# Patient Record
Sex: FEMALE | Race: OTHER | Employment: UNEMPLOYED | URBAN - METROPOLITAN AREA
[De-identification: names, ages, dates, MRNs, and addresses within clinical notes are randomized per-mention and may not be internally consistent; named-entity substitution may affect disease eponyms.]

---
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
## 2020-02-06 ENCOUNTER — HOSPITAL ENCOUNTER (EMERGENCY)
Age: 20
Discharge: HOME | End: 2020-02-06
Attending: STUDENT IN AN ORGANIZED HEALTH CARE EDUCATION/TRAINING PROGRAM | Admitting: STUDENT IN AN ORGANIZED HEALTH CARE EDUCATION/TRAINING PROGRAM

## 2020-02-06 VITALS
RESPIRATION RATE: 16 BRPM | BODY MASS INDEX: 19.63 KG/M2 | SYSTOLIC BLOOD PRESSURE: 114 MMHG | HEART RATE: 72 BPM | DIASTOLIC BLOOD PRESSURE: 58 MMHG | OXYGEN SATURATION: 100 % | WEIGHT: 104 LBS | HEIGHT: 61 IN | TEMPERATURE: 98.6 F

## 2020-02-06 DIAGNOSIS — N64.4 BREAST PAIN: Primary | ICD-10-CM

## 2020-02-06 PROCEDURE — 99281 EMR DPT VST MAYX REQ PHY/QHP: CPT

## 2020-02-06 RX ORDER — IBUPROFEN 600 MG/1
600 TABLET ORAL
Qty: 20 TAB | Refills: 0 | OUTPATIENT
Start: 2020-02-06 | End: 2020-07-13

## 2020-02-07 NOTE — ED TRIAGE NOTES
Pt ambulatory to triage, c/o L breast pain with \"little ball\" starting 3 days ago. Describes pain as burning and radiating to whole breast, Denies taking pain medications PTA, denies breast feeding.

## 2020-02-07 NOTE — ED PROVIDER NOTES
23 y.o. female with past medical history significant for hepatitis B and asthma who presents from private vehicle with chief complaint of breast pain. Pt c/o left breast pain and states she noticed a \"little ball\" to the left breast 3 days ago. Pt describes the breast pain as burning. Pt is currently on her menstrual cycle. Pt denies taking any medications for pain relief. Pt denies breast redness/ drainage, breast feeding, or recent breast surgery. There are no other acute medical concerns at this time. PCP: MORTEZA Avitia     Past Medical History:  No date: Asthma  No date: Hepatitis B  No date: History of rashes as a child  No past surgical history on file. Note written by Efrain Estrada, as dictated by Deidra Washington DNP 7:31 PM            The history is provided by the patient. No  was used. Past Medical History:   Diagnosis Date    Asthma     Hepatitis B     History of rashes as a child        No past surgical history on file. No family history on file.     Social History     Socioeconomic History    Marital status: SINGLE     Spouse name: Not on file    Number of children: Not on file    Years of education: Not on file    Highest education level: Not on file   Occupational History    Not on file   Social Needs    Financial resource strain: Not on file    Food insecurity:     Worry: Not on file     Inability: Not on file    Transportation needs:     Medical: Not on file     Non-medical: Not on file   Tobacco Use    Smoking status: Never Smoker    Smokeless tobacco: Never Used   Substance and Sexual Activity    Alcohol use: No    Drug use: No    Sexual activity: Never     Birth control/protection: None   Lifestyle    Physical activity:     Days per week: Not on file     Minutes per session: Not on file    Stress: Not on file   Relationships    Social connections:     Talks on phone: Not on file     Gets together: Not on file Attends Sikh service: Not on file     Active member of club or organization: Not on file     Attends meetings of clubs or organizations: Not on file     Relationship status: Not on file    Intimate partner violence:     Fear of current or ex partner: Not on file     Emotionally abused: Not on file     Physically abused: Not on file     Forced sexual activity: Not on file   Other Topics Concern    Not on file   Social History Narrative    Not on file         ALLERGIES: Patient has no known allergies. Review of Systems   Constitutional: Negative for appetite change, chills, diaphoresis, fatigue and fever. HENT: Negative for congestion, ear discharge, ear pain, sinus pressure, sinus pain, sore throat and trouble swallowing. Eyes: Negative for photophobia, pain, redness and visual disturbance. Respiratory: Negative for cough, chest tightness, shortness of breath and wheezing. Cardiovascular: Negative for chest pain and palpitations. Gastrointestinal: Negative for abdominal distention, abdominal pain, nausea and vomiting. Endocrine: Negative. Genitourinary: Negative for difficulty urinating, dysuria, flank pain, frequency and urgency. Musculoskeletal: Negative for back pain, neck pain and neck stiffness. + Left breast pain. Skin: Negative for color change, pallor, rash and wound. Allergic/Immunologic: Negative. Neurological: Negative for dizziness, syncope, speech difficulty, weakness and headaches. Hematological: Does not bruise/bleed easily. Psychiatric/Behavioral: Negative for behavioral problems and confusion. The patient is not nervous/anxious. All other systems reviewed and are negative. There were no vitals filed for this visit. Physical Exam  Vitals signs and nursing note reviewed. Constitutional:       General: She is not in acute distress. Appearance: She is well-developed. HENT:      Head: Normocephalic and atraumatic.       Right Ear: External ear normal.      Left Ear: External ear normal.      Nose: Nose normal.      Mouth/Throat:      Mouth: Mucous membranes are moist.   Eyes:      General:         Right eye: No discharge. Left eye: No discharge. Conjunctiva/sclera: Conjunctivae normal.      Pupils: Pupils are equal, round, and reactive to light. Neck:      Musculoskeletal: Normal range of motion and neck supple. Vascular: No JVD. Trachea: No tracheal deviation. Cardiovascular:      Rate and Rhythm: Normal rate and regular rhythm. Pulses: Normal pulses. Heart sounds: Normal heart sounds. No murmur. No gallop. Pulmonary:      Effort: Pulmonary effort is normal. No respiratory distress. Breath sounds: Normal breath sounds. No wheezing or rales. Chest:      Chest wall: No tenderness. Comments: Painful dime size lump that is at 3 o'clock position of the left breast. No cellulits. Abdominal:      General: Bowel sounds are normal. There is no distension. Palpations: Abdomen is soft. Tenderness: There is no abdominal tenderness. There is no guarding or rebound. Genitourinary:     Comments: C/o breast pain and lump left side    Musculoskeletal: Normal range of motion. General: No tenderness. Skin:     General: Skin is warm and dry. Coloration: Skin is not pale. Findings: No erythema or rash. Neurological:      Mental Status: She is alert and oriented to person, place, and time. Cranial Nerves: No cranial nerve deficit. Psychiatric:         Behavior: Behavior normal.         Thought Content: Thought content normal.         Judgment: Judgment normal.     Note written by Jcarlos Velez, as dictated by Alexei Martínez DNP 7:31 PM       MDM  Number of Diagnoses or Management Options  Breast pain: new and does not require workup  Diagnosis management comments: Discharged home and follow-up with PCP.   Follow-up with OB/GYN for questionable breast ultrasound. No evidence of cellulitis. No need for p.o. antibiotics. 7:41 PM  Pt has been examined. Left breast with dime sized lump palpable. Patient states painful. Will have patient follow up with OB/GYN. Motrin ordered for pain. No evidence of cellulitis, not breast feeding. Pt agrees to F/U as instructed and agrees to return to ED upon further deterioration. Pt is ready to go home.   Mariah Josue NP      Procedures

## 2020-02-07 NOTE — DISCHARGE INSTRUCTIONS
Patient Education        Dolor de senos: Instrucciones de cuidado - [ Breast Pain: Care Instructions ]  Instrucciones de cuidado    La sensibilidad y el dolor de senos podría aparecer y desaparecer con los periodos menstruales (cíclico) o podría no seguir ningún patrón (no cíclico). El dolor de senos hugo vez es causado por un problema de darell grave. Podría ser necesario hacer pruebas para averiguar la causa. La atención de seguimiento es chemo parte clave de noguera tratamiento y seguridad. Asegúrese de hacer y acudir a todas las citas, y llame a noguera médico si está teniendo problemas. También es chemo buena idea saber los resultados de caridad exámenes y mantener chemo lista de los medicamentos que angelo. ¿Cómo puede cuidarse en el hogar? · Si noguera médico le recetó un medicamento, tómelo exactamente según las indicaciones. Llame a noguera médico si bipin estar teniendo problemas con noguera medicamento. · Para aliviar el dolor y la hinchazón, tome un analgésico (medicamento para el dolor) de venta scott, galileo acetaminofén (Tylenol), ibuprofeno (Advil, Motrin) o naproxeno (Aleve). Dang y siga todas las instrucciones de la [place]. · No tome dos o más analgésicos al [employer], a menos que el médico se lo haya indicado. Muchos analgésicos contienen acetaminofén, es decir, Tylenol. El exceso de acetaminofén (Tylenol) puede ser dañino. · Use un sostén que le dé buen soporte, galileo los que se usan para hacer deporte o correr. · Reduzca la cantidad de grasa en noguera dieta. Si necesita ayuda para planificar comidas saludables, consulte a un dietista. · Kaela por lo menos 30 minutos de ejercicio la mayoría de los días de la [place]. Caminar es chemo buena opción. Es posible que también quiera hacer otras actividades, galileo correr, nadar, [employer], o jugar al tenis o practicar otros deportes de equipo. · Mantenga un patrón de sueño saludable. Rose Adas a dormir a la misma hora todas las noches y levántese a la misma hora cada día.   ¿Cuándo debe pedir ayuda? Llame a noguera médico ahora mismo o busque atención médica inmediata si:    · Tiene cambios nuevos en un seno, galileo:  ? Un bulto o engrosamiento en el seno o la axila. ? Un cambio en el tamaño o la forma del seno. ? Cambios en la piel, galileo hoyuelos o arrugas. ? Secreción de [employer]. ? Un cambio en el color o la textura de la piel del seno o la ingrid oscura alrededor del pezón (areola). ? Un cambio en la forma del pezón (podría parecer galileo si estuviera hundido en el seno).     · Tiene síntomas de infección en el seno, tales galileo:  ? Aumento del dolor, la hinchazón, el enrojecimiento o la temperatura alrededor del seno. ? Vetas rojizas que se extienden desde el seno. ? Pus que supura de un seno. ? Am[place] especial atención a los cambios en noguera darell y asegúrese de comunicarse con noguera médico si:    · Noguera dolor de los senos no disminuye después de 1 semana.     · Tiene un bulto o engrosamiento en el seno o la axila. ¿Dónde puede encontrar más información en inglés? Tad Given a http://www.gray.com/. Silvia R319 en la búsqueda para aprender más acerca de \"Dolor de senos: Instrucciones de cuidado - [ Breast Pain: Care Instructions ]. \"  Revisado: 26 junio, 2019  Versión del contenido: 12.2  © 6071-3413 Healthwise, Incorporated. Las instrucciones de cuidado fueron adaptadas bajo licencia por [employer] (which disclaims liability or warranty for this information). Si usted tiene [place] afección médica o sobre estas instrucciones, siempre pregunte a noguera profesional de darell. [facility] niega toda garantía o responsabilidad por noguera uso de esta información.

## 2020-07-13 ENCOUNTER — APPOINTMENT (OUTPATIENT)
Dept: GENERAL RADIOLOGY | Age: 20
End: 2020-07-13
Attending: EMERGENCY MEDICINE
Payer: SELF-PAY

## 2020-07-13 ENCOUNTER — APPOINTMENT (OUTPATIENT)
Dept: ULTRASOUND IMAGING | Age: 20
End: 2020-07-13
Attending: EMERGENCY MEDICINE
Payer: SELF-PAY

## 2020-07-13 ENCOUNTER — HOSPITAL ENCOUNTER (EMERGENCY)
Age: 20
Discharge: HOME | End: 2020-07-13
Attending: EMERGENCY MEDICINE | Admitting: EMERGENCY MEDICINE
Payer: SELF-PAY

## 2020-07-13 VITALS
OXYGEN SATURATION: 99 % | RESPIRATION RATE: 16 BRPM | HEIGHT: 61 IN | BODY MASS INDEX: 20.77 KG/M2 | SYSTOLIC BLOOD PRESSURE: 104 MMHG | WEIGHT: 110 LBS | TEMPERATURE: 98.6 F | DIASTOLIC BLOOD PRESSURE: 63 MMHG | HEART RATE: 75 BPM

## 2020-07-13 DIAGNOSIS — R10.32 ABDOMINAL PAIN, LLQ (LEFT LOWER QUADRANT): ICD-10-CM

## 2020-07-13 DIAGNOSIS — N94.6 DYSMENORRHEA: Primary | ICD-10-CM

## 2020-07-13 LAB
APPEARANCE UR: CLEAR
BACTERIA URNS QL MICRO: NEGATIVE /HPF
BILIRUB UR QL: NEGATIVE
COLOR UR: ABNORMAL
EPITH CASTS URNS QL MICRO: ABNORMAL /LPF
GLUCOSE UR STRIP.AUTO-MCNC: NEGATIVE MG/DL
HCG UR QL: NEGATIVE
HGB UR QL STRIP: ABNORMAL
KETONES UR QL STRIP.AUTO: NEGATIVE MG/DL
LEUKOCYTE ESTERASE UR QL STRIP.AUTO: NEGATIVE
NITRITE UR QL STRIP.AUTO: NEGATIVE
PH UR STRIP: 6 [PH] (ref 5–8)
PROT UR STRIP-MCNC: NEGATIVE MG/DL
RBC #/AREA URNS HPF: ABNORMAL /HPF (ref 0–5)
SP GR UR REFRACTOMETRY: 1.01 (ref 1–1.03)
UA: UC IF INDICATED,UAUC: ABNORMAL
UROBILINOGEN UR QL STRIP.AUTO: 1 EU/DL (ref 0.2–1)
WBC URNS QL MICRO: ABNORMAL /HPF (ref 0–4)

## 2020-07-13 PROCEDURE — 81025 URINE PREGNANCY TEST: CPT

## 2020-07-13 PROCEDURE — 99284 EMERGENCY DEPT VISIT MOD MDM: CPT

## 2020-07-13 PROCEDURE — 74019 RADEX ABDOMEN 2 VIEWS: CPT

## 2020-07-13 PROCEDURE — 74011250637 HC RX REV CODE- 250/637: Performed by: EMERGENCY MEDICINE

## 2020-07-13 PROCEDURE — 76830 TRANSVAGINAL US NON-OB: CPT

## 2020-07-13 PROCEDURE — 81001 URINALYSIS AUTO W/SCOPE: CPT

## 2020-07-13 RX ORDER — IBUPROFEN 600 MG/1
600 TABLET ORAL
Qty: 20 TAB | Refills: 0 | Status: SHIPPED | OUTPATIENT
Start: 2020-07-13 | End: 2020-11-19 | Stop reason: SDUPTHER

## 2020-07-13 RX ORDER — DICYCLOMINE HYDROCHLORIDE 10 MG/1
10 CAPSULE ORAL 4 TIMES DAILY
Qty: 20 CAP | Refills: 0 | Status: SHIPPED | OUTPATIENT
Start: 2020-07-13 | End: 2020-07-18

## 2020-07-13 RX ORDER — IBUPROFEN 600 MG/1
600 TABLET ORAL
Status: COMPLETED | OUTPATIENT
Start: 2020-07-13 | End: 2020-07-13

## 2020-07-13 RX ADMIN — IBUPROFEN 600 MG: 600 TABLET, FILM COATED ORAL at 12:00

## 2020-07-13 NOTE — DISCHARGE INSTRUCTIONS
Patient Education   Patient Education        Estreñimiento: Instrucciones de cuidado  Constipation: Care Instructions  Instrucciones de cuidado    Tener estreñimiento significa que usted tiene dificultades para eliminar las heces (evacuaciones del intestino). Las personas eliminan heces entre 3 veces al día y Membreno Blanks vez cada 3 días. Lo que es normal para usted puede ser [employer]. El estreñimiento puede ocurrir con dolor en el recto y cólicos. El dolor podría empeorar cuando trata de eliminar las heces. A veces hay pequeñas cantidades de darryl julio viva en el papel higiénico o en la superficie de las heces. [place] se debe a las venas dilatadas cerca del recto (hemorroides). Algunos cambios en noguera [employer] y estilo de armen podrían ayudarle a evitar el estreñimiento continuo. Es posible que el médico además le recete medicamentos para ayudar a aflojar las heces. Algunos medicamentos pueden causar estreñimiento. [place] incluye los analgésicos (medicamentos para el dolor) y los antidepresivos. Infórmele a noguera médico sobre Griffin Renato que usted angelo. Es posible que noguera médico quiera cambiar un medicamento para aliviar caridad síntomas. La atención de seguimiento es chemo parte clave de noguera tratamiento y seguridad. Asegúrese de hacer y acudir a todas las citas, y llame a noguera médico si está teniendo problemas. También es chemo buena idea saber los resultados de caridad exámenes y mantener chemo lista de los medicamentos que angelo. ¿Cómo puede cuidarse en el hogar? · Enriqueta abundantes líquidos, los suficientes galileo para que noguera orina sea de color amarillo destinee o transparente galileo el agua. Si tiene [employer], del corazón o del hígado y tiene que Turner's líquidos, hable con noguera médico antes de aumentar noguera consumo. · Incluya en noguera dieta diaria alimentos ricos en fibra. Estos incluyen frutas, verduras, frijoles (habichuelas) y granos integrales.   · Kaela por lo menos 30 minutos de ejercicio la mayoría de los días de la semana. Caminar es chemo buena opción. Es posible que también quiera hacer otras actividades, galileo correr, nadar, [employer], o jugar al tenis u otros deportes de equipo. · [place] un suplemento de [place], galileo [employer] o Metamucil, todos los GRASSE. Dang y siga todas las indicaciones de la [place]. · Programe tiempo todos los días para evacuar el intestino. Екатерина Helen rutina diaria podría ayudar. Tómese noguera tiempo para evacuar el intestino. · Apoye los pies sobre un banco o taburete pequeño cuando se siente en el inodoro. [place] ayuda a flexionar las caderas y coloca la pelvis en posición de cuclillas. · Noguera médico podría recomendarle un laxante de venta scott para aliviar el estreñimiento. Lourena Danker son Birdie Bridegroom de [place]es[place] (Milk of Magnesia) y [place]. Dang y siga todas las instrucciones de la [place]. No use laxantes de [employer]. ¿Cuándo debe pedir ayuda? Llame a noguera médico ahora mismo o busque atención médica inmediata si:  · Tiene dolor abdominal nuevo o peor. · Tiene náuseas o vómito nuevos o peores. · Tiene darryl en las heces. [place] especial atención a los cambios en noguera darell y asegúrese de comunicarse con noguera médico si:  · Noguera estreñimiento empeora. · No mejora galileo se esperaba. ¿Dónde puede encontrar más información en inglés? Vaya a http://ann-edwardo.info/  Silvia P343 en la búsqueda para aprender Ramila Wilson de \"Estreñimiento: Instrucciones de cuidado. \"  Revisado: 26 junio, 2266               KAR del contenido: 12.5  © 3189-4643 Healthwise, [employer]. Las instrucciones de cuidado fueron adaptadas bajo licencia por [employer] Help Connections (which disclaims liability or warranty for this information). Si usted tiene [place] afección médica o sobre estas instrucciones, siempre pregunte a noguera profesional de darell. [email or web address], [email or web address] niega toda garantía o responsabilidad por noguera uso de esta información.             Cólicos menstruales dolorosos: Instrucciones de cuidado  Painful Menstrual Cramps: Care Instructions  Instrucciones de cuidado    Los cólicos menstruales dolorosos son muy comunes. Muchas mujeres van al médico por cólicos intensos cuando tienen noguera período. Usted puede tener calambres en la espalda, los muslos y el abdomen. También podría tener diarrea, estreñimiento o náuseas. Algunas mujeres también se marean. Los analgésicos (medicamentos para el dolor) y el tratamiento en el hogar pueden ayudar a que se sienta mejor. La atención de seguimiento es chemo parte clave de noguera tratamiento y seguridad. Asegúrese de hacer y acudir a todas las citas, y llame a noguera médico si está teniendo problemas. También es chemo buena idea saber los resultados de caridad exámenes y mantener chemo lista de los medicamentos que angelo. ¿Cómo puede cuidarse en el hogar? · [place] antiinflamatorios para el dolor. El ibuprofeno (Advil, Motrin) y el naproxeno (Aleve) suelen funcionar mejor que la aspirina. ? Sea jenelle con los medicamentos. Hable con noguera médico o noguera farmacéutico antes de jay cualquiera de estos medicamentos. Podrían no ser seguros si angelo otros medicamentos o si tiene otros problemas de [place]. ? Comience a jay la dosis recomendada de analgésico tan pronto galileo sienta dolor. O puede comenzar el día anterior al inicio de noguera período menstrual. Siga tomando el medicamento por todo el tiempo que tenga cólicos. ? Si los medicamentos antiinflamatorios no ayudan, intente con acetaminofén (Tylenol). ? No tome dos o más analgésicos al [employer], a menos que el médico se lo haya indicado. Muchos analgésicos contienen acetaminofén, es decir, Tylenol. El exceso de acetaminofén (Tylenol) puede ser dañino. ? Dang y [address]. · Póngase chemo almohadilla térmica ajustada a baja temperatura o chemo bolsa de [place] sobre el abdomen. O dese un baño de agua tibia. El calor mejora el flujo de darryl y podría aliviar el dolor.   · Acuéstese y ponga chemo almohada debajo de caridad rodillas. O acuéstese de lado con las rodillas dobladas hacia el pecho. [place] ayudará con cualquier tensión en la espalda. · Kaela al menos 30 minutos de ejercicio la mayoría de los días de la [place]. [place] mejora el flujo de darryl y podría reducir el dolor. Caminar es chemo buena opción. Es posible que también quiera hacer otras actividades, galileo correr, nadar, [employer], o jugar al tenis u otros deportes de equipo. ¿Cuándo debe pedir ayuda? Llame [id number] en cualquier momento que considere que necesita atención de [place]. Por ejemplo, llame si:  · Se desmayó (perdió el conocimiento). Llame a noguera médico ahora mismo o busque atención médica inmediata si:  · Tiene dolor repentino e intenso en el abdomen o la pelvis. · Tiene sangrado vaginal intenso. Intenso significa que empapa las toallas sanitarias o los tampones usuales cada hora, abbe 2 o más horas, y elimina coágulos de Jenni. · Siente mareos o aturdimiento, o que está a punto de [place]. Preste especial atención a los cambios en noguera darell y asegúrese de comunicarse con noguera médico si:  · Piensa que pudiera estar Kimberly Pamela. · Tiene un dolor nuevo en el abdomen o la pelvis. · Está tomando un analgésico, bee no la está ayudando. · Se siente débil y cansada. ¿Dónde puede encontrar más información en inglés? Vaya a http://ann-edwardo.info/  Silvia Q153 en la búsqueda para aprender más acerca de \"Cólicos menstruales dolorosos: Instrucciones de cuidado. \"  Revisado: 8 novcash, 4927               ZBRBTAM del contenido: 12.5  © 1627-2204 Healthwise, [employer]. Las instrucciones de cuidado fueron adaptadas bajo licencia por Good [employer] Connections (which disclaims liability or warranty for this information). Si usted tiene [place] afección médica o sobre estas instrucciones, siempre pregunte a noguera profesional de darell.  [place], [employer] niega toda garantía o responsabilidad por noguera uso de esta información.

## 2020-07-13 NOTE — ED TRIAGE NOTES
Patient arrives with c/o L \"ovary pain\" x 2 weeks with light spotting. States her LMP was 6/8/20 and has not had a period this month yet. Pregnancy test taken PTA = negative.

## 2020-07-13 NOTE — ED PROVIDER NOTES
HPI patient is a 80-year-old female who presents the ED reporting 2 weeks of left lower quadrant pain that seems to be worse at night. She started her menstrual cycle 3 days ago but states that it is lighter than usual and has not relieved the pain. She did a home pregnancy test which was negative. A1. Denies fever, cold symptoms, headache, neck pain, visual changes, focal weakness or rash. Denies any difficulty breathing, difficulty swallowing, SOB or chest pain. Denies any nausea, vomiting, urinary symptoms or diarrhea. Pt. Reports that she has not had any food or medications today prior to arrival.        Past Medical History:   Diagnosis Date    Asthma     Hepatitis B     History of rashes as a child        No past surgical history on file. No family history on file.     Social History     Socioeconomic History    Marital status: SINGLE     Spouse name: Not on file    Number of children: Not on file    Years of education: Not on file    Highest education level: Not on file   Occupational History    Not on file   Social Needs    Financial resource strain: Not on file    Food insecurity     Worry: Not on file     Inability: Not on file    Transportation needs     Medical: Not on file     Non-medical: Not on file   Tobacco Use    Smoking status: Never Smoker    Smokeless tobacco: Never Used   Substance and Sexual Activity    Alcohol use: No    Drug use: No    Sexual activity: Never     Birth control/protection: None   Lifestyle    Physical activity     Days per week: Not on file     Minutes per session: Not on file    Stress: Not on file   Relationships    Social connections     Talks on phone: Not on file     Gets together: Not on file     Attends Alevism service: Not on file     Active member of club or organization: Not on file     Attends meetings of clubs or organizations: Not on file     Relationship status: Not on file    Intimate partner violence     Fear of current or ex partner: Not on file     Emotionally abused: Not on file     Physically abused: Not on file     Forced sexual activity: Not on file   Other Topics Concern    Not on file   Social History Narrative    Not on file         ALLERGIES: Patient has no known allergies. Review of Systems   Constitutional: Negative for activity change, appetite change, fever and unexpected weight change. HENT: Negative for rhinorrhea, sore throat and trouble swallowing. Eyes: Negative for visual disturbance. Respiratory: Negative for cough and shortness of breath. Cardiovascular: Negative for chest pain, palpitations and leg swelling. Gastrointestinal: Negative for abdominal pain, diarrhea, nausea and vomiting. Genitourinary: Positive for pelvic pain and vaginal bleeding. Negative for dysuria. Musculoskeletal: Negative for arthralgias, back pain and myalgias. Skin: Negative for rash. Neurological: Negative for dizziness and light-headedness. All other systems reviewed and are negative. Vitals:    07/13/20 1105   BP: 113/73   Pulse: 75   Resp: 16   Temp: 98.6 °F (37 °C)   SpO2: 100%   Weight: 49.9 kg (110 lb)   Height: 5' 1\" (1.549 m)            Physical Exam  Vitals signs and nursing note reviewed. Constitutional:       General: She is not in acute distress. Appearance: She is well-developed. She is not ill-appearing, toxic-appearing or diaphoretic. Comments: Female; non-smoker   HENT:      Head: Normocephalic. Mouth/Throat:      Mouth: Mucous membranes are moist.   Cardiovascular:      Rate and Rhythm: Normal rate and regular rhythm. Pulmonary:      Effort: Pulmonary effort is normal.      Breath sounds: Normal breath sounds. Abdominal:      Palpations: Abdomen is soft. Tenderness: There is abdominal tenderness in the left lower quadrant. There is no guarding or rebound.    Genitourinary:     Comments: Patient refuses pelvic exam after having transvaginal ultrasound; she denies any active vaginal bleeding or discharge. Skin:     General: Skin is warm and dry. Findings: No rash. Neurological:      Mental Status: She is alert and oriented to person, place, and time. Psychiatric:         Mood and Affect: Mood normal.         Behavior: Behavior normal.          MDM       Procedures      Xr Abd Flat/ Erect    Result Date: 7/13/2020  IMPRESSION: Gas pattern is within normal limits. There is slight fecal stasis right and transverse colon. Us Transvaginal    Result Date: 7/13/2020  IMPRESSION: Normal pelvic ultrasound. Labs Reviewed   URINALYSIS W/ REFLEX CULTURE - Abnormal; Notable for the following components:       Result Value    Blood MODERATE (*)     All other components within normal limits   HCG URINE, QL. - POC     Patient has been reexamined and reports good relief from medications given. Discussed the need for increased fiber and fluids in her diet; all recommendations were reviewed. Encourage close follow-up with OB/GYN if symptoms persist.    1:20 PM  Patient's results and plan of care have been reviewed with her. Patient has verbally conveyed her understanding and agreement of her signs, symptoms, diagnosis, treatment and prognosis and additionally agrees to follow up as recommended or return to the Emergency Room should her condition change prior to follow-up. Discharge instructions have also been provided to the patient with some educational information regarding her diagnosis as well a list of reasons why she would want to return to the ER prior to her follow-up appointment should her condition change. Gabino Pablo NP

## 2020-07-13 NOTE — ED NOTES
MD Brian Rubio reviewed discharge instructions with the patient. The patient verbalized understanding. Pt confirmed understanding of need for follow up with primary care provider. Important sections of discharge instructions highlighted for patient. Pt is not in any current distress and shows no evidence of clinical instability. Pt is hemodynamically/respiratorily stable. Paperwork given by provider and reviewed with patient, opportunity for questions/clarification given. Pt was given work note if applicable/requested. Pt given RX for motrin/bentyl. Pt denies any additional complaints. Pt walked out of ED.     Patient Vitals for the past 4 hrs:   Temp Pulse Resp BP SpO2   07/13/20 1159 -- -- -- 104/63 98 %   07/13/20 1105 98.6 °F (37 °C) 75 16 113/73 100 %         Cristina Stephenson RN

## 2020-11-04 ENCOUNTER — VIRTUAL VISIT (OUTPATIENT)
Dept: FAMILY MEDICINE CLINIC | Age: 20
End: 2020-11-04

## 2020-11-04 DIAGNOSIS — J45.909 UNCOMPLICATED ASTHMA, UNSPECIFIED ASTHMA SEVERITY, UNSPECIFIED WHETHER PERSISTENT: ICD-10-CM

## 2020-11-04 DIAGNOSIS — K29.70 GASTRITIS WITHOUT BLEEDING, UNSPECIFIED CHRONICITY, UNSPECIFIED GASTRITIS TYPE: ICD-10-CM

## 2020-11-04 DIAGNOSIS — F41.9 ANXIETY: Primary | ICD-10-CM

## 2020-11-04 DIAGNOSIS — K59.00 CONSTIPATION, UNSPECIFIED CONSTIPATION TYPE: ICD-10-CM

## 2020-11-04 PROCEDURE — 99443 PR PHYS/QHP TELEPHONE EVALUATION 21-30 MIN: CPT | Performed by: NURSE PRACTITIONER

## 2020-11-04 RX ORDER — ALBUTEROL SULFATE 90 UG/1
2 AEROSOL, METERED RESPIRATORY (INHALATION)
Qty: 1 INHALER | Refills: 0 | Status: SHIPPED | OUTPATIENT
Start: 2020-11-04 | End: 2021-02-22 | Stop reason: SDUPTHER

## 2020-11-04 RX ORDER — DOCUSATE SODIUM 100 MG/1
100 CAPSULE, LIQUID FILLED ORAL 2 TIMES DAILY
Qty: 60 CAP | Refills: 0 | Status: SHIPPED | OUTPATIENT
Start: 2020-11-04 | End: 2020-11-19 | Stop reason: SDUPTHER

## 2020-11-04 NOTE — PROGRESS NOTES
11:10 AM  Return for cody soon (anxiety); H pylori and nonfasting urgent labs soon; F2F visit [facility] when avail (tailbone). Discharge nurse encounter completed via telephoned call. Name and  verified. AVS, prescription and pharmacy location reviewed . Patient to follow up with CVAN counselor and lab appt. For non fasting labs and h-pilory kit and instructions - registration aware and will help with scheduling this appointments. This has been fully explained to the patient, who indicates understanding and agrees with plan. No further questions at this time. Diane Rivers RN    At the end of telephone call patient requesting a refill on ventolin Inhaler, please see refill encounter for further details.  Diane Rivers RN

## 2020-11-04 NOTE — PROGRESS NOTES
Coordination of Care  1. Have you been to the ER, urgent care clinic since your last visit? Hospitalized since your last visit? No    2. Have you seen or consulted any other health care providers outside of the [address] [place] [address] since your last visit? Include any pap smears or colon screening. No    Does the patient need refills? YES    Learning Assessment Complete? yes  Depression Screening complete in the past 12 months? yes    Patient is at work at this moment and has no access to vital sign equipment.

## 2020-11-04 NOTE — TELEPHONE ENCOUNTER
Nurse telephoning patient to go over DC instructions. Patient requesting a refill for ventolin HFA. Patient would like for medication to go to [facility] due to better price. [employer] coupon code sent to patient, per patient's request. Instructed patient that request will be send to provider and that she should check with the pharmacy after 4:00pm. This has been fully explained to the patient, who indicates understanding and agrees with plan. No further questions at this time.  Jessi Reddy RN

## 2020-11-04 NOTE — PROGRESS NOTES
: [place] (but patient does speak [place])   Consent: She and/or health care decision maker has provided verbal consent to proceed: Yes   Pursuant to the emergency declaration under the [facility][note 3][facility], [facility] waiver authority and the [employer] Act, this Virtual Telephone Visit was conducted to reduce the patient's risk of exposure to COVID-19. Total Time: minutes: 21-30 minutes  Diagnoses and all orders for this visit:    1. Anxiety  -     REFERRAL TO BEHAVIORAL HEALTH  -     METABOLIC PANEL, COMPREHENSIVE; Future  -     HEMOGLOBIN A1C WITH EAG; Future  -     HCG QL SERUM; Future  -     CBC WITH AUTOMATED DIFF; Future  -     TSH 3RD GENERATION; Future    2. Gastritis without bleeding, unspecified chronicity, unspecified gastritis type  -     H PYLORI AG, STOOL; Future    3. Constipation, unspecified constipation type  -     docusate sodium (COLACE) 100 mg capsule; Take 1 Cap by mouth two (2) times a day. For constipation. 4. Uncomplicated asthma, unspecified asthma severity, unspecified whether persistent    Refill for albuterol sent in per patient request.  Follow-up and Dispositions    · Return for cody soon (anxiety); H pylori and nonfasting urgent labs soon; F2F visit [facility] when avail (tailbone). I communicated with the patient and/or health care decision maker about details of this discussion including any medical advice provided: we discussed and reviewed deep breathing for in the moment anxiety. Agrees to counseling with Cristina Crowe. Desiring pregnancy, no medications at this time. Also pain of her tailbone - to evaluate when she returns F2F following labs and H Pylori testing.    -come in for H Pylori and nonfasting urgent labs  -then follow up F2F with [facility] (tailbone injury, painful defecation)  -Princess Huffman is a 21 y.o. female evaluated via telephone on 11/4/2020.   Patient identification verified with 2 identifiers. Chief Complaint   Patient presents with    Anxiety     x4 weeks    Epigastric Pain     pt c/o pain and nausea, bloated getting worse in the last week. History of Present Illness  When patient made the appointment, the reasons stated were menstrual problems and acid reflux. No LMP recorded. When was first day of LMP? October 16  Could you be pregnant? Desires pregnancy  Specific symptoms include panic when not busy, not feeling rested  Have you taken any medications for this? no  Not improving  Anxiety - overwhelming emotions, can't catch breath. As a child lots of trauma and memories come back. Her fiance is an officer and she worries about him. History of cutting - but she doesn't do that anymore. She works in construction. Pressure of household chores. She is restless. She drinks z-quil every night. She feels unrested in the morning. She is trying to be pregnant. Deep breathing. Randomly cry. Not suicidal.  Would like to go away like 2 hours to her 's parent's house but has too much anxiety to ride in the car. When she is busy, like at work, she is not bothered by the anxiety. Gastritis - bothering her whole life. Gets frequently hungry. If she doesn't eat soon, she has the pain in the mouth of the stomach. She gets diarrhea if she doesn't eat soon. Pressing down on it feels better. If she talks it makes it worse. Does not drink coffee. Ginger ale makes nausea better. Does not drink energy drinks. Has breakfast - 2 waffles and milk or a bowl of cereal.  She drinks non-lactose milk. No greasy/fried foods, cooks meals from home. Chipotle or [employer]. Spicy doesn't bother her. Eats a lot of yogurt, Activia. Diarrhea only happens when she doesn't eat. She feels skinnier. Eats a lot of carrots. She doesn't take any pain medication.   She fell on her tailbone when she was 12; fell playing soccer last week and it's been painful to go to the bathroom. She is not taking anything for pain. States she has the wind knocked out of her a lot in soccer, concerned this may be affecting her gastritis. No physical exam performed due to telephone visit. I affirm this is a Patient Initiated Episode with a Patient who has not had a related appointment within my department in the past 7 days or scheduled within the next 24 hours. MATY Verma expressed understanding and agreed to this plan.      Colace 100 mg bid

## 2020-11-17 ENCOUNTER — HOSPITAL ENCOUNTER (OUTPATIENT)
Dept: LAB | Age: 20
Discharge: HOME | End: 2020-11-17

## 2020-11-17 ENCOUNTER — CLINICAL SUPPORT (OUTPATIENT)
Dept: FAMILY MEDICINE CLINIC | Age: 20
End: 2020-11-17

## 2020-11-17 ENCOUNTER — LAB ONLY (OUTPATIENT)
Dept: FAMILY MEDICINE CLINIC | Age: 20
End: 2020-11-17

## 2020-11-17 DIAGNOSIS — F41.9 ANXIETY: ICD-10-CM

## 2020-11-17 DIAGNOSIS — Z23 ENCOUNTER FOR IMMUNIZATION: Primary | ICD-10-CM

## 2020-11-17 LAB
ALBUMIN SERPL-MCNC: 4.2 G/DL (ref 3.5–5)
ALBUMIN/GLOB SERPL: 1.2 {RATIO} (ref 1.1–2.2)
ALP SERPL-CCNC: 62 U/L (ref 45–117)
ALT SERPL-CCNC: 18 U/L (ref 12–78)
ANION GAP SERPL CALC-SCNC: 6 MMOL/L (ref 5–15)
AST SERPL-CCNC: 14 U/L (ref 15–37)
BASOPHILS # BLD: 0.1 K/UL (ref 0–0.1)
BASOPHILS NFR BLD: 1 % (ref 0–1)
BILIRUB SERPL-MCNC: 0.4 MG/DL (ref 0.2–1)
BUN SERPL-MCNC: 17 MG/DL (ref 6–20)
BUN/CREAT SERPL: 18 (ref 12–20)
CALCIUM SERPL-MCNC: 9.3 MG/DL (ref 8.5–10.1)
CHLORIDE SERPL-SCNC: 104 MMOL/L (ref 97–108)
CO2 SERPL-SCNC: 29 MMOL/L (ref 21–32)
CREAT SERPL-MCNC: 0.95 MG/DL (ref 0.55–1.02)
DIFFERENTIAL METHOD BLD: NORMAL
EOSINOPHIL # BLD: 0.3 K/UL (ref 0–0.4)
EOSINOPHIL NFR BLD: 3 % (ref 0–7)
ERYTHROCYTE [DISTWIDTH] IN BLOOD BY AUTOMATED COUNT: 12.4 % (ref 11.5–14.5)
EST. AVERAGE GLUCOSE BLD GHB EST-MCNC: 94 MG/DL
GLOBULIN SER CALC-MCNC: 3.5 G/DL (ref 2–4)
GLUCOSE SERPL-MCNC: 73 MG/DL (ref 65–100)
HBA1C MFR BLD: 4.9 % (ref 4–5.6)
HCG SERPL QL: NEGATIVE
HCT VFR BLD AUTO: 42.6 % (ref 35–47)
HGB BLD-MCNC: 14 G/DL (ref 11.5–16)
IMM GRANULOCYTES # BLD AUTO: 0 K/UL (ref 0–0.04)
IMM GRANULOCYTES NFR BLD AUTO: 0 % (ref 0–0.5)
LYMPHOCYTES # BLD: 2.8 K/UL (ref 0.8–3.5)
LYMPHOCYTES NFR BLD: 32 % (ref 12–49)
MCH RBC QN AUTO: 29.5 PG (ref 26–34)
MCHC RBC AUTO-ENTMCNC: 32.9 G/DL (ref 30–36.5)
MCV RBC AUTO: 89.9 FL (ref 80–99)
MONOCYTES # BLD: 0.5 K/UL (ref 0–1)
MONOCYTES NFR BLD: 6 % (ref 5–13)
NEUTS SEG # BLD: 5.1 K/UL (ref 1.8–8)
NEUTS SEG NFR BLD: 58 % (ref 32–75)
NRBC # BLD: 0 K/UL (ref 0–0.01)
NRBC BLD-RTO: 0 PER 100 WBC
PLATELET # BLD AUTO: 341 K/UL (ref 150–400)
PMV BLD AUTO: 11.3 FL (ref 8.9–12.9)
POTASSIUM SERPL-SCNC: 3.8 MMOL/L (ref 3.5–5.1)
PROT SERPL-MCNC: 7.7 G/DL (ref 6.4–8.2)
RBC # BLD AUTO: 4.74 M/UL (ref 3.8–5.2)
SODIUM SERPL-SCNC: 139 MMOL/L (ref 136–145)
TSH SERPL DL<=0.05 MIU/L-ACNC: 1.02 UIU/ML (ref 0.36–3.74)
WBC # BLD AUTO: 8.7 K/UL (ref 3.6–11)

## 2020-11-17 PROCEDURE — 85025 COMPLETE CBC W/AUTO DIFF WBC: CPT

## 2020-11-17 PROCEDURE — 80053 COMPREHEN METABOLIC PANEL: CPT

## 2020-11-17 PROCEDURE — 84443 ASSAY THYROID STIM HORMONE: CPT

## 2020-11-17 PROCEDURE — 90686 IIV4 VACC NO PRSV 0.5 ML IM: CPT

## 2020-11-17 PROCEDURE — 83036 HEMOGLOBIN GLYCOSYLATED A1C: CPT

## 2020-11-17 PROCEDURE — 84703 CHORIONIC GONADOTROPIN ASSAY: CPT

## 2020-11-17 PROCEDURE — 90471 IMMUNIZATION ADMIN: CPT

## 2020-11-17 NOTE — PROGRESS NOTES
PT was here from lab only today. CBC, A1C, CMP, TSH, HCG QL Serum  CMA gave pt supplies and instruction to H. Pylori test.  Were draw collected without complain or complication.   Per Isabel Dasilva

## 2020-11-18 NOTE — PROGRESS NOTES
11/19/2020  Assessment/Plan:   Diagnoses and all orders for this visit:    1. Injury of coccyx, initial encounter  -     ibuprofen (MOTRIN) 600 mg tablet; Take 1 Tab by mouth every eight (8) hours as needed for Pain. 2. Constipation, unspecified constipation type  -     docusate sodium (COLACE) 100 mg capsule; Take 1 Cap by mouth two (2) times a day. For constipation. 3. Encounter for immunization  -     TETANUS AND DIPHTHERIA TOXOIDS (TD) ADSORBED, PRES. FREE, IN INDIVIDS. >=7, IM      Follow-up and Dispositions    · Return if symptoms worsen or fail to improve, for consider referral to PT or ortho if not improving. She was not taking colace. Emphasized importance of avoiding constipation (increased water and fiber intake), not playing soccer for 2-4 weeks, use of ice/heat, use of antiinflammatories. There are no preventive care reminders to display for this patient. Gela Gottlieb, MSN, RN, FNP-BC, BC-ADM  Coreen Javier expressed understanding of this plan. [facility]-MAIN OFFICE  Subjective:   Coreen Javier is a 21 y.o. female. Chief Complaint   Patient presents with    Follow-up     tailbone problems    Labs     lab results    Immunization/Injection   CC: Tailbone pain  History of Present Illness: Two weeks ago she fell on her tailbone while playing soccer. It's been painful to defecate. Not taking anything for pain. Previous fall on tailbone at age 15. Telephone visit 11/4/20 and recommended colace bid. She has not been able to  the colace. \"I don't like taking medication\". Pain with palpation and at rest at sacral spine without crepitus. Pain worse with walking standing running, stretching. Pain present constantly. She played soccer again this week and felt some weakness in her left ankle. Now has pain from the left lateral ankle radiating to the tailbone and includes the left buttocks.   Review of Systems: Positive for: gastritis (we are waiting on patient for H Pylori). Negative for: fever, chest pain, shortness of breath, leg swelling, bowel or bladder incontinence. Social History: She  reports that she has never smoked. She has never used smokeless tobacco. She reports that she does not drink alcohol or use drugs. testing. Current Medications: has a current medication list which includes the following prescription(s): ibuprofen, docusate sodium, and albuterol. Objective:     Visit Vitals  BP (!) 97/51 (BP 1 Location: Left arm, BP Patient Position: Sitting)   Pulse 77   Temp 97 °F (36.1 °C) (Temporal)   Ht 4' 11.84\" (1.52 m)   Wt 109 lb (49.4 kg)   LMP 11/13/2020   SpO2 100%   BMI 21.40 kg/m²      Wt Readings from Last 2 Encounters:   11/19/20 109 lb (49.4 kg)   07/13/20 110 lb (49.9 kg)      No results found for any visits on 11/19/20. Physical Examination:   General appearance - well developed, no acute distress. Chest - clear to auscultation. Heart - regular rate and rhythm without murmurs, rubs, or gallops. Abdomen - bowel sounds present x 4, NT, ND  Extremities - distal sensation intact, no CCE. SLR negative. With zohaib as chaperone, rectal exam without hemorrhoids. Bruise of tailbone externally noted. No crepitus. Also pain of the left lateral ankle radiating to the left buttocks with internal rotation of the foot.

## 2020-11-18 NOTE — PROGRESS NOTES
Complete blood count, average glucose, pregnancy testing, thyroid testing, liver and kidney function all normal.

## 2020-11-18 NOTE — PROGRESS NOTES
Patient requested the influenza vaccine. Vaccination consent filled and signed by patient. No contraindications to the vaccine. VIS statement given and reviewed. Potential side effects reviewed. Reviewed reasons to seek emergency assistance. Influenza  vaccine given per protocol and policy. Entered in [facility] [employer]. Patient verbalized understanding, and does not have any questions or concerns. Patient did not show s/s of allergic reaction at discharge. Patient tolerated procedure well, no complications noted. Patient verbalized understanding.  Lexi Davis RN

## 2020-11-19 ENCOUNTER — OFFICE VISIT (OUTPATIENT)
Dept: FAMILY MEDICINE CLINIC | Age: 20
End: 2020-11-19

## 2020-11-19 VITALS
HEART RATE: 77 BPM | BODY MASS INDEX: 21.4 KG/M2 | OXYGEN SATURATION: 100 % | SYSTOLIC BLOOD PRESSURE: 97 MMHG | TEMPERATURE: 97 F | HEIGHT: 60 IN | WEIGHT: 109 LBS | DIASTOLIC BLOOD PRESSURE: 51 MMHG

## 2020-11-19 DIAGNOSIS — K59.00 CONSTIPATION, UNSPECIFIED CONSTIPATION TYPE: ICD-10-CM

## 2020-11-19 DIAGNOSIS — Z23 ENCOUNTER FOR IMMUNIZATION: ICD-10-CM

## 2020-11-19 DIAGNOSIS — S39.92XA INJURY OF COCCYX, INITIAL ENCOUNTER: Primary | ICD-10-CM

## 2020-11-19 PROCEDURE — 90714 TD VACC NO PRESV 7 YRS+ IM: CPT

## 2020-11-19 PROCEDURE — 99214 OFFICE O/P EST MOD 30 MIN: CPT | Performed by: NURSE PRACTITIONER

## 2020-11-19 RX ORDER — DOCUSATE SODIUM 100 MG/1
100 CAPSULE, LIQUID FILLED ORAL 2 TIMES DAILY
Qty: 60 CAP | Refills: 0 | Status: SHIPPED | OUTPATIENT
Start: 2020-11-19 | End: 2021-01-04 | Stop reason: SDUPTHER

## 2020-11-19 RX ORDER — IBUPROFEN 600 MG/1
600 TABLET ORAL
Qty: 20 TAB | Refills: 0 | Status: SHIPPED | OUTPATIENT
Start: 2020-11-19 | End: 2021-01-28

## 2020-11-19 NOTE — PROGRESS NOTES
TD Immunization given per provider order following policy and protocol. Please see scanned consent form. VIS given. No contraindications to vaccine. Documented in [facility]. Instructions for adverse reactions discussed. Explained that if symptoms (rash, hives SOB, temperature higher of 102'f) to go to the nearest ER. Patient expressed understanding. No adverse reaction noted at time of discharge from vaccine area. Glen Mcintosh RN    Reviewed AVS and OTC  Prescription information reviewed with patient. AVS printed and given. Patient aware to return if s/s worsen or fail to improve. This has been fully explained to the patient, who indicates understanding and agrees with plan. No further questions at this time.  Glen Mcintosh RN

## 2020-11-19 NOTE — PROGRESS NOTES
Coordination of Care  1. Have you been to the ER, urgent care clinic since your last visit? Hospitalized since your last visit? No    2. Have you seen or consulted any other health care providers outside of the [address] [place] [address] since your last visit? Include any pap smears or colon screening. No    Does the patient need refills? NO    Learning Assessment Complete?  yes  Depression Screening complete in the past 12 months? yes

## 2020-11-19 NOTE — PATIENT INSTRUCTIONS
Tailbone Injury: Care Instructions  Your Care Instructions     Injuries to the tailbone (coccyx) can occur when you slip or fall and hit your tailbone. A tailbone injury causes pain when you sit, especially when you slump or sit on a hard seat. Straining to have a bowel movement can also be very painful. Tailbone injuries can take several months to heal, but home treatment can ease the pain. Follow-up care is a key part of your treatment and safety. Be sure to make and go to all appointments, and call your doctor if you are having problems. It's also a good idea to know your test results and keep a list of the medicines you take. How can you care for yourself at home? · Take pain medicines exactly as directed. ? If the doctor gave you a prescription medicine for pain, take it as prescribed. ? If you are not taking a prescription pain medicine, ask your doctor if you can take an over-the-counter medicine to reduce pain and swelling. Read and follow all instructions on the label. · Put ice or a cold pack on your tailbone for 10 to 20 minutes at a time. Try to do this every 1 to 2 hours for the next 3 days (when you are awake) or until the swelling goes down. Put a thin cloth between the ice and your skin. · You can switch between using ice and heat 2 to 3 days after the injury. Take a warm bath for 20 minutes, 3 or 4 times a day. You can use a doughnut-shaped pillow or towel in the tub to pad the hard tub surface. · Do not sit on hard, unpadded surfaces. Sit on a doughnut-shaped pillow to take pressure off the tailbone area. · Avoid constipation, because straining to have a bowel movement will increase your tailbone pain. ? Include fruits, vegetables, beans, and whole grains in your diet each day. These foods are high in fiber. ? Drink plenty of fluids, enough so that your urine is light yellow or clear like water.  If you have kidney, heart, or liver disease and have to limit fluids, talk with your doctor before you increase your fluid intake. ? Get some exercise every day. Build up slowly to 30 to 60 minutes a day on 5 or more days of the week. ? Take a fiber supplement, such as Citrucel or Metamucil, every day if needed. Read and follow all instructions on the label. ? Schedule time each day for a bowel movement. A daily routine may help. Take your time and do not strain when having your bowel movement. When should you call for help? Call your doctor now or seek immediate medical care if you have new or worse symptoms in your legs or buttocks. Symptoms may include:    · Numbness or tingling.     · Weakness.     · Pain. Watch closely for changes in your health, and be sure to contact your doctor if:    · You do not get better as expected. Where can you learn more? Go to http://www.gray.com/  Enter P195 in the search box to learn more about \"Tailbone Injury: Care Instructions. \"  Current as of: March 2, 2020               Content Version: 12.6  © 2006-2020 [employer]. Care instructions adapted under license by [employer] (which disclaims liability or warranty for this information). If you have questions about a medical condition or this instruction, always ask your healthcare professional. [address] any warranty or liability for your use of this information.

## 2020-12-10 ENCOUNTER — LAB ONLY (OUTPATIENT)
Dept: FAMILY MEDICINE CLINIC | Age: 20
End: 2020-12-10

## 2020-12-10 ENCOUNTER — HOSPITAL ENCOUNTER (OUTPATIENT)
Dept: LAB | Age: 20
Discharge: HOME | End: 2020-12-10

## 2020-12-10 DIAGNOSIS — K29.70 GASTRITIS WITHOUT BLEEDING, UNSPECIFIED CHRONICITY, UNSPECIFIED GASTRITIS TYPE: ICD-10-CM

## 2020-12-10 PROCEDURE — 87338 HPYLORI STOOL AG IA: CPT

## 2020-12-13 LAB
H PYLORI AG STL QL IA: NEGATIVE
SPECIMEN SOURCE: NORMAL

## 2020-12-23 ENCOUNTER — TELEPHONE (OUTPATIENT)
Dept: FAMILY MEDICINE CLINIC | Age: 20
End: 2020-12-23

## 2021-01-04 ENCOUNTER — TELEPHONE (OUTPATIENT)
Dept: FAMILY MEDICINE CLINIC | Age: 21
End: 2021-01-04

## 2021-01-04 DIAGNOSIS — K59.00 CONSTIPATION, UNSPECIFIED CONSTIPATION TYPE: ICD-10-CM

## 2021-01-04 RX ORDER — DOCUSATE SODIUM 100 MG/1
100 CAPSULE, LIQUID FILLED ORAL 2 TIMES DAILY
Qty: 60 CAP | Refills: 0 | Status: SHIPPED | OUTPATIENT
Start: 2021-01-04 | End: 2021-05-21 | Stop reason: ALTCHOICE

## 2021-01-22 ENCOUNTER — TELEPHONE (OUTPATIENT)
Dept: FAMILY MEDICINE CLINIC | Age: 21
End: 2021-01-22

## 2021-01-26 ENCOUNTER — OFFICE VISIT (OUTPATIENT)
Dept: FAMILY MEDICINE CLINIC | Age: 21
End: 2021-01-26

## 2021-01-26 DIAGNOSIS — Z63.8 FAMILY DISCORD: ICD-10-CM

## 2021-01-26 DIAGNOSIS — F33.1 MAJOR DEPRESSIVE DISORDER, RECURRENT EPISODE, MODERATE (HCC): Primary | ICD-10-CM

## 2021-01-26 PROCEDURE — 90837 PSYTX W PT 60 MINUTES: CPT | Performed by: SOCIAL WORKER

## 2021-01-26 SDOH — SOCIAL STABILITY - SOCIAL INSECURITY: OTHER SPECIFIED PROBLEMS RELATED TO PRIMARY SUPPORT GROUP: Z63.8

## 2021-01-27 NOTE — PROGRESS NOTES
1/28/2021 Speaks English    Assessment:   Diagnoses and all orders for this visit:    1. Functional ovarian cysts  -     AMB POC GLUCOSE BLOOD, BY GLUCOSE MONITORING DEVICE  -     AMB POC HEMOGLOBIN (HGB)  -     AMB POC URINE PREGNANCY TEST, VISUAL COLOR COMPARISON  -     norgestimate-ethinyl estradioL (ORTHO-CYCLEN, SPRINTEC) 0.25-35 mg-mcg tab; Take 1 Tab by mouth daily.  -     US PELV NON OBS; Future  -     US TRANSVAGINAL; Future  -     CHLAMYDIA/GC PCR; Future    2. Postural dizziness with presyncope  -     REFERRAL TO CARDIOLOGY    3. Hypotension, unspecified hypotension type      Plan:  I communicated with the patient and/or health care decision maker about the following: There are no preventive care reminders to display for this patient. Follow-up and Dispositions    · Return in about 2 months (around 3/28/2021) for F2F visit follow up (gastritis, menstrual pain/ovarian cysts). -start bcp   -schedule ultrasounds  -return for gastritis evaluation. Denies taking ibuprofen. Lorrie Alvarado expressed understanding of this plan. An electronic signature was used to authenticate this note. -- MATY Larson- [facility] Harlan Alvarado is a 21 y.o. female established patient who presents for the evaluation of the following chief complaint(s):  Chief Complaint   Patient presents with    Pelvic Pain     left pelvid pain x 5 months. Pt states has dizziness too   [facility]     labs results. Patient saw the result in [address] [place] [address] [address] but she has questions    Mom and sister. Sister is taking medication. Periods are irregular - 13th then the 20th. Varying lengths. Pain is horrible. No suprapubic tenderness.  -ultrasounds      History of Present Illness:   Left pelvic pain for 5 months, associated with before and during her period. This most recent period her pain was so horrible that she could not sleep. Describes it as a sensation of shocks.   Has also noticed some pain following urination and increased urination. She does not have dysuria. Ibuprofen and tylenol do not help. Periods can be up to 5 weeks apart and may be long or short. They are always painful. She had a miscarriage at age 16. She has not been able to become pregnant since that time. States she often notices a yellowish discharge around her period. Dizziness for many years, since childhood. Associated with standing up too fast or even standing up at a regular speed. She states it is a sensation like everything turns black for a second. Has not passed out. Also feels like the room is spinning when this happens. She has always had low blood pressure when we have measured it. Patient's last menstrual period was 01/18/2021 (exact date). Review of Systems: Negative for: fever, chest pain, shortness of breath, leg swelling. Social History:  reports that she has never smoked. She has never used smokeless tobacco. She reports that she does not drink alcohol or use drugs.    Family History   Problem Relation Age of Onset    Other Mother         ovarian cysts    Other Sister         ovarian cysts, on bcp   Physical Examination: Not pregnant, not anemic, not diabetic  Results for orders placed or performed in visit on 01/28/21   AMB POC GLUCOSE BLOOD, BY GLUCOSE MONITORING DEVICE   Result Value Ref Range    Glucose POC  mg/dL   AMB POC HEMOGLOBIN (HGB)   Result Value Ref Range    Hemoglobin (POC) 12.3    AMB POC URINE PREGNANCY TEST, VISUAL COLOR COMPARISON   Result Value Ref Range    VALID INTERNAL CONTROL POC Yes     HCG urine, Ql. (POC) Negative Negative     Visit Vitals  BP (!) 94/55 (BP 1 Location: Left arm, BP Patient Position: Sitting)   Pulse 78   Temp 98.4 °F (36.9 °C) (Oral)   Ht 5' 0.04\" (1.525 m)   Wt 108 lb 6.4 oz (49.2 kg)   LMP 01/18/2021 (Exact Date)   SpO2 100%   BMI 21.14 kg/m²     Wt Readings from Last 2 Encounters:   01/28/21 108 lb 6.4 oz (49.2 kg)   11/19/20 109 lb (49.4 kg) General appearance - well developed, no acute distress. Neck supple, no thyromegaly, no adenopathy. Chest - clear to auscultation. Heart - regular rate and rhythm without murmurs, rubs, or gallops. Abdomen - bowel sounds present x 4, NT, ND. No suprapubic tenderness. Pelvic exam - deferred, patient preference  Extremities - distal sensation intact, no CCE.

## 2021-01-28 ENCOUNTER — TELEPHONE (OUTPATIENT)
Dept: FAMILY MEDICINE CLINIC | Age: 21
End: 2021-01-28

## 2021-01-28 ENCOUNTER — OFFICE VISIT (OUTPATIENT)
Dept: FAMILY MEDICINE CLINIC | Age: 21
End: 2021-01-28

## 2021-01-28 VITALS
SYSTOLIC BLOOD PRESSURE: 94 MMHG | TEMPERATURE: 98.4 F | OXYGEN SATURATION: 100 % | HEART RATE: 78 BPM | WEIGHT: 108.4 LBS | DIASTOLIC BLOOD PRESSURE: 55 MMHG | HEIGHT: 60 IN | BODY MASS INDEX: 21.28 KG/M2

## 2021-01-28 DIAGNOSIS — R55 POSTURAL DIZZINESS WITH PRESYNCOPE: ICD-10-CM

## 2021-01-28 DIAGNOSIS — R42 POSTURAL DIZZINESS WITH PRESYNCOPE: ICD-10-CM

## 2021-01-28 DIAGNOSIS — I95.9 HYPOTENSION, UNSPECIFIED HYPOTENSION TYPE: ICD-10-CM

## 2021-01-28 DIAGNOSIS — N83.299 FUNCTIONAL OVARIAN CYSTS: Primary | ICD-10-CM

## 2021-01-28 PROBLEM — Z87.59 HISTORY OF MISCARRIAGE: Status: ACTIVE | Noted: 2018-01-28

## 2021-01-28 LAB
GLUCOSE POC: NORMAL MG/DL
HCG URINE, QL. (POC): NEGATIVE
HGB BLD-MCNC: 12.3 G/DL
VALID INTERNAL CONTROL?: YES

## 2021-01-28 PROCEDURE — 85018 HEMOGLOBIN: CPT | Performed by: NURSE PRACTITIONER

## 2021-01-28 PROCEDURE — 82962 GLUCOSE BLOOD TEST: CPT | Performed by: NURSE PRACTITIONER

## 2021-01-28 PROCEDURE — 81025 URINE PREGNANCY TEST: CPT | Performed by: NURSE PRACTITIONER

## 2021-01-28 PROCEDURE — 99214 OFFICE O/P EST MOD 30 MIN: CPT | Performed by: NURSE PRACTITIONER

## 2021-01-28 RX ORDER — NORGESTIMATE AND ETHINYL ESTRADIOL 0.25-0.035
1 KIT ORAL DAILY
Qty: 1 DOSE PACK | Refills: 5 | Status: SHIPPED | OUTPATIENT
Start: 2021-01-28 | End: 2021-02-22 | Stop reason: SDUPTHER

## 2021-01-28 NOTE — TELEPHONE ENCOUNTER
Patient would like to get a record of her immunizations, please send to address on file. This nurse does not have access to printer.      Thank you

## 2021-01-28 NOTE — PROGRESS NOTES
Coordination of Care  1. Have you been to the ER, urgent care clinic since your last visit? Hospitalized since your last visit? No    2. Have you seen or consulted any other health care providers outside of the [address] [place] [address] since your last visit? Include any pap smears or colon screening. No    Does the patient need refills? NO    Learning Assessment Complete?  yes  Depression Screening complete in the past 12 months? yes     Results for orders placed or performed in visit on 01/28/21   AMB POC GLUCOSE BLOOD, BY GLUCOSE MONITORING DEVICE   Result Value Ref Range    Glucose POC  mg/dL   AMB POC HEMOGLOBIN (HGB)   Result Value Ref Range    Hemoglobin (POC) 12.3    AMB POC URINE PREGNANCY TEST, VISUAL COLOR COMPARISON   Result Value Ref Range    VALID INTERNAL CONTROL POC Yes     HCG urine, Ql. (POC) Negative Negative
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
## 2021-02-04 ENCOUNTER — HOSPITAL ENCOUNTER (OUTPATIENT)
Dept: ULTRASOUND IMAGING | Age: 21
Discharge: HOME | End: 2021-02-04
Attending: NURSE PRACTITIONER

## 2021-02-04 DIAGNOSIS — N83.299 FUNCTIONAL OVARIAN CYSTS: ICD-10-CM

## 2021-02-04 PROCEDURE — 76856 US EXAM PELVIC COMPLETE: CPT

## 2021-02-04 PROCEDURE — 76830 TRANSVAGINAL US NON-OB: CPT

## 2021-02-22 DIAGNOSIS — N83.299 FUNCTIONAL OVARIAN CYSTS: ICD-10-CM

## 2021-02-23 RX ORDER — NORGESTIMATE AND ETHINYL ESTRADIOL 0.25-0.035
1 KIT ORAL DAILY
Qty: 1 DOSE PACK | Refills: 5 | Status: SHIPPED | OUTPATIENT
Start: 2021-02-23 | End: 2021-04-23 | Stop reason: SINTOL

## 2021-02-23 RX ORDER — ALBUTEROL SULFATE 90 UG/1
2 AEROSOL, METERED RESPIRATORY (INHALATION)
Qty: 1 INHALER | Refills: 0 | Status: SHIPPED | OUTPATIENT
Start: 2021-02-23 | End: 2021-05-21 | Stop reason: ALTCHOICE

## 2021-04-01 DIAGNOSIS — N83.299 FUNCTIONAL OVARIAN CYSTS: ICD-10-CM

## 2021-04-01 RX ORDER — NORGESTIMATE AND ETHINYL ESTRADIOL 0.25-0.035
1 KIT ORAL DAILY
Qty: 1 DOSE PACK | Refills: 5 | Status: CANCELLED | OUTPATIENT
Start: 2021-04-01

## 2021-04-02 NOTE — TELEPHONE ENCOUNTER
Telephone call to patient (38 minutes) to discuss that she had gone out of town and was not taking the pills for 10 days. She had traveled to [place]. During the trip to [place], she was taking medication to prevent car sickness. Lately she has been quite nauseous and more bothered by food odors. She has been taking chewable pills she had for nausea, not able to tell me what they are. She has also noticed that her breasts are raymond and hurt. She has been pregnant once before and had a miscarriage in 2017. Other symptoms she is having include constipation for which she is taking colace. Today she had some diarrhea. She has been taking melatonin for sleep. She has had sexual relations several times since being off the birth control pill. Her LMP was on March 19, 2021 and it was very light with spotting the last 3 days, ending March 26. I advised the following: stop taking the nausea pills. Do not take NSAIDS. Stop the melatonin. We are not renewing the birth control prescription. Check a pregnancy test in a few days. Also discussed reasons to go to the ED: vomiting/can't drink liquids, any vaginal bleeding, intolerable abdominal pain. I will ask the call back nurses to kindly check on her next week. Patient stated understanding and had no further questions at close of call.   Yanira Mitchell NP

## 2021-04-06 ENCOUNTER — TELEPHONE (OUTPATIENT)
Dept: FAMILY MEDICINE CLINIC | Age: 21
End: 2021-04-06

## 2021-04-06 NOTE — TELEPHONE ENCOUNTER
Spoke with pt about symptoms. Pt states she was told to wait until next week to take pregnancy test but she has taken 2, both negative. The last test was taken 4/4/21, per pt. Discussed symptoms she has \"pain to uterus\", pain with intercourse \"feels to tight\", feeling bloated, some nausea, breast discomfort, increase in burping. Pt has also had headaches everyday but has not been able to sleep through the night and if she takes a nap the headache decreases and then is \"just gone\". Pt denies blurred vision,  Denies pain with urination. Pt does not use condoms and is ok with pregnancy. Pt will continue with plan to take pregnancy test next week. Pt also stated she has appt with [facility] 4/23/21. Encouraged to drink plenty of fluids. If headache pain becomes increasingly worse, if pelvic/lower abdominal pain becomes worse, blurred vision, or nausea/vomitting and unable to keep food or water down pt instructed to go to ED. Pt verbalizes understanding, is ok plan and had no further questions.

## 2021-04-22 NOTE — PROGRESS NOTES
4/23/2021 SPEAKS ENGLISH. Assessment:   Diagnoses and all orders for this visit:    1. Menorrhagia with irregular cycle  -     drospirenone-ethinyl estradioL (JOSSELYN) 3-0.02 mg tab; Take daily as prescribed. 2. Breast pain  -     naproxen (NAPROSYN) 500 mg tablet; Take 1 Tab by mouth two (2) times daily (with meals). 3. Postural dizziness with presyncope  -     REFERRAL TO CARDIOLOGY      Plan:  I communicated with the patient and/or health care decision maker about the following:    -COVID-19 Information  -restart pill, try Josselyn, coupon texted to patient.    -reordered AN cardiology  Health Maintenance Due   Topic Date Due    Hepatitis C Screening  Never done    COVID-19 Vaccine (1) Never done    PAP AKA CERVICAL CYTOLOGY  Never done     Follow-up and Dispositions    · Return for VV 2 weeks LK; [facility]. Lorrie Roberts expressed understanding of this plan. An electronic signature  ed to authenticate this note. -- MATY Lara ASHVIN - [address] Roberts is a 24 y.o. female established patient who presents for the evaluation of the following chief complaint(s): nausea, sleepiness  Chief Complaint   Patient presents with    Follow Up Chronic Condition    Breast pain     pt c/o bilateral breast pain x 2 months    Other     Pt is requesting covid-19 information       History of Present Illness:   Breast Pain started when she started taking the bcp. When she stopped the birth control pills the breasts did not stop hurting. Reports she feels her breasts are bigger, and one is slightly bigger than the other. Her first period was very light and not painful. When she went off the pills due to missing some doses, the breasts continued to hurt. Her LMP started 3 days ago and was very heavy. Today she is having light bleeding. For 2 months. Breasts hurt only with touching. Also can be bothersome during sleep when she rolls on her sides.   First period on the pills was light. Currently on BCP? Prescribed on 2/23/21. Due to some missed pills we stopped it. Still taking? Yes/no  Did she go to cardiology? No, waiting to hear      No results found for any visits on 04/23/21. Review of Systems: Negative for: fever, chest pain, shortness of breath, leg swelling. Social History:  reports that she has never smoked. She has never used smokeless tobacco. She reports that she does not drink alcohol or use drugs. Current Medications: has a current medication list which includes the following prescription(s): drospirenone-ethinyl estradiol, naproxen, albuterol, and docusate sodium. Physical Examination:   General appearance - well developed, no acute distress. Breast/chest - right breast appears slightly larger than the left. Indentation marks noted where the bra touches the skin, on the back and sides. They are not deeply grooved. No axillary adenopathy. Tenderness noted bilaterally on the lateral half of the breasts. No discrete mass. Chest - clear to auscultation. Heart - regular rate and rhythm without murmurs, rubs, or gallops.   Abdomen - bowel sounds present x 4, NT, ND    Visit Vitals  BP 97/62 (BP 1 Location: Left upper arm, BP Patient Position: Sitting, BP Cuff Size: Adult)   Pulse 84   Temp 98.5 °F (36.9 °C) (Temporal)   Ht 5' 0.24\" (1.53 m)   Wt 107 lb 12.8 oz (48.9 kg)   LMP 04/20/2021   SpO2 100%   BMI 20.89 kg/m²

## 2021-04-23 ENCOUNTER — OFFICE VISIT (OUTPATIENT)
Dept: FAMILY MEDICINE CLINIC | Age: 21
End: 2021-04-23

## 2021-04-23 VITALS
SYSTOLIC BLOOD PRESSURE: 97 MMHG | TEMPERATURE: 98.5 F | HEIGHT: 60 IN | WEIGHT: 107.8 LBS | DIASTOLIC BLOOD PRESSURE: 62 MMHG | OXYGEN SATURATION: 100 % | BODY MASS INDEX: 21.17 KG/M2 | HEART RATE: 84 BPM

## 2021-04-23 DIAGNOSIS — N64.4 BREAST PAIN: ICD-10-CM

## 2021-04-23 DIAGNOSIS — R55 POSTURAL DIZZINESS WITH PRESYNCOPE: ICD-10-CM

## 2021-04-23 DIAGNOSIS — R42 POSTURAL DIZZINESS WITH PRESYNCOPE: ICD-10-CM

## 2021-04-23 DIAGNOSIS — N92.1 MENORRHAGIA WITH IRREGULAR CYCLE: Primary | ICD-10-CM

## 2021-04-23 PROCEDURE — 99213 OFFICE O/P EST LOW 20 MIN: CPT | Performed by: NURSE PRACTITIONER

## 2021-04-23 RX ORDER — NAPROXEN 500 MG/1
500 TABLET ORAL 2 TIMES DAILY WITH MEALS
Qty: 60 TAB | Refills: 1 | Status: SHIPPED | OUTPATIENT
Start: 2021-04-23 | End: 2021-05-21 | Stop reason: ALTCHOICE

## 2021-04-23 RX ORDER — DROSPIRENONE AND ETHINYL ESTRADIOL 0.02-3(28)
KIT ORAL
Qty: 1 PACKAGE | Refills: 3 | Status: SHIPPED | OUTPATIENT
Start: 2021-04-23 | End: 2021-05-21 | Stop reason: ALTCHOICE

## 2021-04-23 NOTE — PROGRESS NOTES
I have copied the provider's check out note here and have reviewed these items with the patient today: Check Out Note    Check-out Note: -patient asking about [facility] cardiology referral. Laine Chowdary are next steps? -COVID-19 information   -begin pills after your period (tomorrow)    Return for VV 2 weeks LK; [facility]. I have printed AVS and reviewed it with patient today. I discussed with the patient that her [facility] referral has been resubmitted, and that she will receive a call from a  for a financial screening and to move forward with seeing Cardiology. The patient verbalized understanding. The patient was instructed to stop at the  on her way out to schedule her follow up appointments and to be placed on the pap screening list. Patient verbalized understanding.  Nickolas More RN

## 2021-04-23 NOTE — PROGRESS NOTES
Coordination of Care  1. Have you been to the ER, urgent care clinic since your last visit? Hospitalized since your last visit? No    2. Have you seen or consulted any other health care providers outside of the [address] [place] [address] since your last visit? Include any pap smears or colon screening. No    Does the patient need refills? NO    Learning Assessment Complete?  yes  Depression Screening complete in the past 12 months? yes

## 2021-04-27 ENCOUNTER — OFFICE VISIT (OUTPATIENT)
Dept: FAMILY MEDICINE CLINIC | Age: 21
End: 2021-04-27

## 2021-04-27 DIAGNOSIS — Z71.89 COUNSELING AND COORDINATION OF CARE: Primary | ICD-10-CM

## 2021-04-27 PROCEDURE — 99080 SPECIAL REPORTS OR FORMS: CPT | Performed by: PHYSICIAN ASSISTANT

## 2021-04-27 NOTE — PROGRESS NOTES
MICHELLE RYAN called patient and started financial screening for AN. OW mailed forms to patient, along with written instructions in Swazi for patient to sign. Pending SL.

## 2021-04-29 DIAGNOSIS — F33.1 MAJOR DEPRESSIVE DISORDER, RECURRENT EPISODE, MODERATE (HCC): Primary | ICD-10-CM

## 2021-04-29 RX ORDER — FLUOXETINE 10 MG/1
10 CAPSULE ORAL DAILY
Qty: 30 CAP | Refills: 2 | Status: SHIPPED | OUTPATIENT
Start: 2021-04-29 | End: 2021-05-21 | Stop reason: ALTCHOICE

## 2021-05-10 ENCOUNTER — VIRTUAL VISIT (OUTPATIENT)
Dept: FAMILY MEDICINE CLINIC | Age: 21
End: 2021-05-10

## 2021-05-10 DIAGNOSIS — L98.9 SKIN LESION: Primary | ICD-10-CM

## 2021-05-10 DIAGNOSIS — N92.1 MENORRHAGIA WITH IRREGULAR CYCLE: ICD-10-CM

## 2021-05-10 DIAGNOSIS — N64.4 BREAST PAIN: ICD-10-CM

## 2021-05-10 PROCEDURE — 99443 PR PHYS/QHP TELEPHONE EVALUATION 21-30 MIN: CPT | Performed by: NURSE PRACTITIONER

## 2021-05-10 NOTE — PROGRESS NOTES
SPEAKS ENGLISH. Patient identification verified with 2 identifiers. Patient offered video visit and declined. Consent: She and/or health care decision maker has provided verbal consent to proceed: Yes   Total Time: minutes: 21-30 minutes  Pursuant to the emergency declaration under the [facility] authority and the [employer] Act, this Virtual Telephone Visit was conducted to reduce the patient's risk of exposure to COVID-19. Assessment:   Diagnoses and all orders for this visit:    1. Skin lesion    2. Breast pain    3. Menorrhagia with irregular cycle      Plan:  I communicated with the patient and/or health care decision maker about the following:    -discussed there is possibility of pregnancy. Do not take advil, naproxen, ibuprofen, aspirin. (History of miscarriage 4 years ago). If in doubt, take a pregnancy test.  -to ER with worsening pain or bleeding    Follow-up and Dispositions    · Return for Soon available F2F LK (skin, check fingerstick glucose). -will need A1c and glucose same day as appointment, urine pregnancy test  -she has missed 4 days of Gardenia and today is the 5th day. Her period started today. Stop Gardenia. Amalia Arita is a 24 y.o. female evaluated via telephone on 5/10/2021. Chief Complaint   Patient presents with    Follow-up     2 week follow up on new birth control. Missed taking pills for 4 days, got period 10 days early. History of Present Illness  Started taking Gardenia Monday, April 26th. Missed pills on days 5/6, 5/7, 5/8, 5/9. Got period today (5/10). It is light. Still with breast soreness. Period light and not painful. States she would like to be pregnant. She has 4 year history of infertility. Discussed Donita Head is not able to work up infertility. Right hand is growing a dark spot on the knuckle. \"It looks like dirt that won't wash off. \"  Dad has this on his face, does not have diabetes. Will have her return for this. No physical exam performed due to telephone visit. I affirm this is a Patient Initiated Episode with a Patient who has not had a related appointment within my department in the past 7 days or scheduled within the next 24 hours. MATY Kat expressed understanding and agreed to this plan.

## 2021-05-17 ENCOUNTER — TELEPHONE (OUTPATIENT)
Dept: FAMILY MEDICINE CLINIC | Age: 21
End: 2021-05-17

## 2021-05-19 NOTE — PROGRESS NOTES
2021 [facility] [address] [place] [address] (: 2000) is a 24 y.o. female, established patient, here for evaluation of the following chief complaint(s):  Follow-up (From VV 5/10/21-skin lesion, breast pain- menorrhagia) and Other (Pt wants the covid-19 vaccine today if is possible)       ASSESSMENT/PLAN:  Below is the assessment and plan developed based on review of pertinent history, physical exam, labs, studies, and medications. 1. Postural dizziness with presyncope - follow up with [facility]. -     AMB POC EKG ROUTINE W/ 12 LEADS, INTER & REP (sinus bradycardia at 59 bpm)  2. Skin lesion - use sunscreen. No evidence of diabetes or prediabetes. -     AMB POC GLUCOSE BLOOD, BY GLUCOSE MONITORING DEVICE  3. Menorrhagia with irregular cycle - not pregnant.  -     AMB POC URINE PREGNANCY TEST, VISUAL COLOR COMPARISON      No follow-ups on file. SUBJECTIVE/OBJECTIVE:  HPI  Results for orders placed or performed in visit on 21   AMB POC URINE PREGNANCY TEST, VISUAL COLOR COMPARISON   Result Value Ref Range    VALID INTERNAL CONTROL POC Yes     HCG urine, Ql. (POC) Negative Negative   AMB POC GLUCOSE BLOOD, BY GLUCOSE MONITORING DEVICE   Result Value Ref Range    Glucose POC nf-83 MG/DL       Review of Systems  Review of Systems: Negative for: fever, chest pain, shortness of breath, leg swelling, dizziness, + for presyncope when she was going to climb a ladder or when she is on one knee and then gets up. Also notes she gets very sleepy. Social History:  reports that she has never smoked. She has never used smokeless tobacco. She reports that she does not drink alcohol and does not use drugs. Current Medications:   No current outpatient medications on file.      Physical Examination:   Vitals:    21 1446 21 1448   BP: (!) 92/47 (!) 95/59   Pulse: 75    Temp: 98.3 °F (36.8 °C)    TempSrc: Temporal    SpO2: 99%    Weight: 106 lb (48.1 kg)    Height: 5' 0.24\" (1.53 m) Patient's last menstrual period was 05/10/2021. General appearance - well developed, no acute distress. Chest - clear to auscultation. Heart - regular rate and rhythm without murmurs, rubs, or gallops. Abdomen - bowel sounds present x 4, NT, ND  Extremities - distal sensation intact, no CCE. Area of skin darkening of the posterior right hand, dime sized. An electronic signature was used to authenticate this note.   -- Ever Howard, NP

## 2021-05-21 ENCOUNTER — OFFICE VISIT (OUTPATIENT)
Dept: FAMILY MEDICINE CLINIC | Age: 21
End: 2021-05-21

## 2021-05-21 VITALS
WEIGHT: 106 LBS | OXYGEN SATURATION: 99 % | TEMPERATURE: 98.3 F | BODY MASS INDEX: 20.81 KG/M2 | SYSTOLIC BLOOD PRESSURE: 95 MMHG | HEIGHT: 60 IN | HEART RATE: 75 BPM | DIASTOLIC BLOOD PRESSURE: 59 MMHG

## 2021-05-21 DIAGNOSIS — R42 POSTURAL DIZZINESS WITH PRESYNCOPE: Primary | ICD-10-CM

## 2021-05-21 DIAGNOSIS — R55 POSTURAL DIZZINESS WITH PRESYNCOPE: Primary | ICD-10-CM

## 2021-05-21 DIAGNOSIS — Z71.89 COUNSELING AND COORDINATION OF CARE: Primary | ICD-10-CM

## 2021-05-21 DIAGNOSIS — L98.9 SKIN LESION: ICD-10-CM

## 2021-05-21 DIAGNOSIS — N92.1 MENORRHAGIA WITH IRREGULAR CYCLE: ICD-10-CM

## 2021-05-21 LAB
GLUCOSE POC: NORMAL MG/DL
HCG URINE, QL. (POC): NEGATIVE
VALID INTERNAL CONTROL?: YES

## 2021-05-21 PROCEDURE — 82962 GLUCOSE BLOOD TEST: CPT | Performed by: NURSE PRACTITIONER

## 2021-05-21 PROCEDURE — 93000 ELECTROCARDIOGRAM COMPLETE: CPT | Performed by: NURSE PRACTITIONER

## 2021-05-21 PROCEDURE — 81025 URINE PREGNANCY TEST: CPT | Performed by: NURSE PRACTITIONER

## 2021-05-21 PROCEDURE — 99214 OFFICE O/P EST MOD 30 MIN: CPT | Performed by: NURSE PRACTITIONER

## 2021-05-21 PROCEDURE — 99080 SPECIAL REPORTS OR FORMS: CPT | Performed by: PHYSICIAN ASSISTANT

## 2021-05-21 NOTE — PROGRESS NOTES
OW met with patient. Patient brought pending documents. AN application was completed and left on AN tray at [address] for [employer]. OW instructed patient to call AN on or after 06/04/21. Patient verbalized understanding. Patient also needed Care Card for a pending bill from [facility]. OW assisted and Care Card application was completed and signed. Patient will mail it to [facility]. OW provided written instructions in Israeli on what to do next. Patient verbalized understanding.

## 2021-05-21 NOTE — PROGRESS NOTES
Coordination of Care  1. Have you been to the ER, urgent care clinic since your last visit? Hospitalized since your last visit? No    2. Have you seen or consulted any other health care providers outside of the [address] [place] [address] since your last visit? Include any pap smears or colon screening. No    Does the patient need refills? NO    Learning Assessment Complete?  yes  Depression Screening complete in the past 12 months? yes     Results for orders placed or performed in visit on 05/21/21   AMB POC URINE PREGNANCY TEST, VISUAL COLOR COMPARISON   Result Value Ref Range    VALID INTERNAL CONTROL POC Yes     HCG urine, Ql. (POC) Negative Negative   AMB POC GLUCOSE BLOOD, BY GLUCOSE MONITORING DEVICE   Result Value Ref Range    Glucose POC nf-83 MG/DL

## 2021-07-19 ENCOUNTER — TELEPHONE (OUTPATIENT)
Dept: FAMILY MEDICINE CLINIC | Age: 21
End: 2021-07-19

## 2021-07-19 NOTE — TELEPHONE ENCOUNTER
Calling patient Re; Appt. Reminder. No answer, unable to leave message. Sent text message with generic appt. Information date, time and location.  Betzy Ortez RN

## 2021-07-20 ENCOUNTER — OFFICE VISIT (OUTPATIENT)
Dept: FAMILY MEDICINE CLINIC | Age: 21
End: 2021-07-20

## 2021-07-20 ENCOUNTER — HOSPITAL ENCOUNTER (OUTPATIENT)
Dept: LAB | Age: 21
Discharge: HOME | End: 2021-07-20

## 2021-07-20 VITALS
BODY MASS INDEX: 20.35 KG/M2 | SYSTOLIC BLOOD PRESSURE: 96 MMHG | DIASTOLIC BLOOD PRESSURE: 54 MMHG | HEART RATE: 59 BPM | OXYGEN SATURATION: 100 % | WEIGHT: 105 LBS

## 2021-07-20 DIAGNOSIS — N97.9 INFERTILITY, FEMALE: ICD-10-CM

## 2021-07-20 DIAGNOSIS — Z01.419 ENCOUNTER FOR WELL WOMAN EXAM: Primary | ICD-10-CM

## 2021-07-20 PROCEDURE — 99213 OFFICE O/P EST LOW 20 MIN: CPT | Performed by: PHYSICIAN ASSISTANT

## 2021-07-20 PROCEDURE — 88142 CYTOPATH C/V THIN LAYER: CPT

## 2021-07-20 NOTE — PROGRESS NOTES
Coordination of Care  1. Have you been to the ER, urgent care clinic since your last visit? Hospitalized since your last visit? No    2. Have you seen or consulted any other health care providers outside of the [address] [place] [address] since your last visit? Include any pap smears or colon screening. No    Does the patient need refills? YES    Learning Assessment Complete?  yes  Depression Screening complete in the past 12 months? yes

## 2021-07-20 NOTE — PROGRESS NOTES
Assessment/Plan:    Diagnoses and all orders for this visit:    1. Encounter for well woman exam  -     PAP, LIQUID BASED, MANUAL SCREEN; Future    2. Infertility, female    Recommend that the couple start with sperm analysis due to the cost of infertility tx/work up      Follow-up and Dispositions    · Return in about 1 year (around 2022). NAKIA Bowman expressed understanding of this plan. An AVS was printed and given to the patient.      ----------------------------------------------------------------------    Chief Complaint   Patient presents with    Well Woman     pt having heavy periods and irregular bleeding since March but July period was very light. .Pt was taken off about 1 or 2 ago BCP and pain has increased and having 2 periods a month. History of Present Illness:    Pt presents for first gyn exam. She is a 23 yo female,  thinks that she was around \"3 months pregnant\" when she lost the baby about 4 years ago. She is still with the same partner and neither has any live children. She has monthly period. She was on OCP but she was taken off of it. Currently, she is taking OTC prenatal vitamins. They are not using any form of barrier contraception or other form of birth control    She is currently in a safe relationship, no risk of DV. No pain with intercourse  She is not working \"taking a break\" from working.  Dealing with her stress by painting or playing soccer  Has some linear scars on her left forearm- I asked about these and she said that they were very old and she has no suicidal ideation and feels well     Past Medical History:   Diagnosis Date    Asthma     Hepatitis B     History of miscarriage 2018    History of rashes as a child            No Known Allergies    Social History     Tobacco Use    Smoking status: Never Smoker    Smokeless tobacco: Never Used   Vaping Use    Vaping Use: Never used   Substance Use Topics    Alcohol use: No    Drug use: No       Family History   Problem Relation Age of Onset    Other Mother         ovarian cysts    Other Sister         ovarian cysts, on bcp       Physical Exam:     Visit Vitals  BP (!) 96/54 (BP 1 Location: Left upper arm, BP Patient Position: Sitting)   Pulse (!) 59   Wt 105 lb (47.6 kg)   LMP 07/05/2021   SpO2 100%   BMI 20.35 kg/m²       A&Ox3  WDWN NAD  Respirations normal and non labored  Breast exam- neg for suspicious mass, dimpling, retractions, skin color changes or nipple changes. We discussed (and I had her feel) what normal breast tissue in young women feels like as she has fibroglandular tissue present atif   Pelvic exam- ext neg for lesion or discharge.

## 2021-09-15 NOTE — PROGRESS NOTES
9/15/2021  [facility] referral to [facility] has been canceled due to not contacting [facility] within the 60 day deadline.   Jumana Coe NP

## 2022-02-03 ENCOUNTER — HOSPITAL ENCOUNTER (OUTPATIENT)
Dept: LAB | Age: 22
Discharge: HOME | End: 2022-02-03

## 2022-02-03 ENCOUNTER — OFFICE VISIT (OUTPATIENT)
Dept: FAMILY MEDICINE CLINIC | Age: 22
End: 2022-02-03

## 2022-02-03 VITALS
HEIGHT: 60 IN | WEIGHT: 152.2 LBS | TEMPERATURE: 98.2 F | HEART RATE: 60 BPM | SYSTOLIC BLOOD PRESSURE: 94 MMHG | DIASTOLIC BLOOD PRESSURE: 57 MMHG | OXYGEN SATURATION: 99 % | BODY MASS INDEX: 29.88 KG/M2

## 2022-02-03 DIAGNOSIS — Z86.19 HISTORY OF HEPATITIS C: ICD-10-CM

## 2022-02-03 DIAGNOSIS — M67.40 GANGLION CYST: Primary | ICD-10-CM

## 2022-02-03 DIAGNOSIS — Z23 NEEDS FLU SHOT: ICD-10-CM

## 2022-02-03 DIAGNOSIS — J45.990 MILD EXERCISE-INDUCED ASTHMA: ICD-10-CM

## 2022-02-03 PROCEDURE — 90471 IMMUNIZATION ADMIN: CPT

## 2022-02-03 PROCEDURE — 99214 OFFICE O/P EST MOD 30 MIN: CPT | Performed by: NURSE PRACTITIONER

## 2022-02-03 PROCEDURE — 86803 HEPATITIS C AB TEST: CPT

## 2022-02-03 PROCEDURE — 90686 IIV4 VACC NO PRSV 0.5 ML IM: CPT

## 2022-02-03 RX ORDER — ALBUTEROL SULFATE 90 UG/1
2 AEROSOL, METERED RESPIRATORY (INHALATION)
Qty: 18 G | Refills: 1 | Status: SHIPPED | OUTPATIENT
Start: 2022-02-03

## 2022-02-03 NOTE — PROGRESS NOTES
An After Visit Summary was printed and given to the patient. Patient was made aware that a member from are team will reach out to her regarding the access now process. Flu vaccine was administered with consent. Patient waited 15 minutes after administration. No reaction noted.

## 2022-02-03 NOTE — PROGRESS NOTES
Coordination of Care  1. Have you been to the ER, urgent care clinic since your last visit? Hospitalized since your last visit? No    2. Have you seen or consulted any other health care providers outside of the [address] [place] [address] since your last visit? Include any pap smears or colon screening. No    Does the patient need refills? YES    Learning Assessment Complete?  yes  Depression Screening complete in the past 12 months? yes

## 2022-02-03 NOTE — PROGRESS NOTES
2/3/2022 : Shine Giordano (: 2000) is a 24 y.o. female, established patient, here for evaluation of the following chief complaint(s): Mass (left wrist lump w/ pain) and Medication Refill (inhaler)     ASSESSMENT/PLAN:  Below is the assessment and plan developed based on review of pertinent history, physical exam, labs, studies, and medications. 1. Ganglion cyst  -     REFERRAL TO SURGERY  2. Mild exercise-induced asthma  -     albuterol (PROVENTIL HFA, VENTOLIN HFA, PROAIR HFA) 90 mcg/actuation inhaler; Take 2 Puffs by inhalation every four (4) hours as needed for Wheezing., Normal, Disp-18 g, R-1  3. History of hepatitis C  -     HEPATITIS C AB; Future  4. Needs flu shot  -     INFLUENZA VIRUS VAC QUAD,SPLIT,PRESV FREE SYRINGE IM     No follow-ups on file. SUBJECTIVE/OBJECTIVE:  HPI   1 month, growing, more and more difficult to move the wrist.  Had something similar on the right wrist about 3 years ago and had surgery and it helped, did not grow back. No results found for any visits on 22. Takes tylenol, 1 pill every 8 hours but not every day and no more than 1 pill a day. Review of Systems: Negative for: fever, chest pain, shortness of breath, leg swelling. Social History:  reports that she has never smoked. She has never used smokeless tobacco. She reports that she does not drink alcohol and does not use drugs. Current Medications:   Current Outpatient Medications   Medication Sig    albuterol (PROVENTIL HFA, VENTOLIN HFA, PROAIR HFA) 90 mcg/actuation inhaler Take 2 Puffs by inhalation every four (4) hours as needed for Wheezing. Physical Examination:   Vitals:    22 0940 22 0942   BP: (!) 95/49 (!) 94/57   Pulse: 60    Temp: 98.2 °F (36.8 °C)    TempSrc: Temporal    SpO2: 99%    Weight: 152 lb 3.2 oz (69 kg)    Height: 5' 0.04\" (1.525 m)     Patient's last menstrual period was 2022.   General appearance - well developed, no acute distress. With left hand pronated and in a fist, flexed toward the ulnar side, the cyst on the radial aspect is tender with palpation. Pain with flexion and extension. Chest - clear to auscultation. Heart - regular rate and rhythm without murmurs, rubs, or gallops. Abdomen - bowel sounds present x 4, NT, ND  Extremities - no CCE. An electronic signature was used to authenticate this note.   -- Camille Gallagher, NP

## 2022-02-04 LAB — HCV AB SERPL QL IA: NONREACTIVE

## 2022-03-08 ENCOUNTER — TELEPHONE (OUTPATIENT)
Dept: FAMILY MEDICINE CLINIC | Age: 22
End: 2022-03-08

## 2022-03-08 NOTE — TELEPHONE ENCOUNTER
Dinorah at [facility] preadmission testing calling the [address] [place] office left message requesting most recent office visit and EKG by fax- routing to [facility] now coordinator.      Phone number [phone]  Fax [phone]

## 2022-03-15 NOTE — TELEPHONE ENCOUNTER
03-08-22: Return call made to Job Mcneil at [facility] pre-admission testing. She stated that they needed a copy of any recent office notes, labs, and imaging for the patient in order to prepare for her surgery with Dr. Moi Ramsey at the end of the month. I explained that the Obinna Lopes has not seen the patient again since we sent the patient's referral to [facility] who then scheduled the patient to see Dr. Moi Ramsey. Job Mcneil understood that I would contact [facility] to clarify this unusual request for records. T/C made to [facility] at [facility] to review Mindi's request. We both thought that Dr. Sarah Haddad office would be providing the pre-admission testing office with any information needed for the patient. [facility] contacted Dr. Sarah Haddad office to review the request and then sent me the following email reply:    Just sending you a quick note that Dr. Sarah Haddad office said to disregard the request from [address] [place] [address]. That isnt the normal process and they will take care of whatever the hospital needs for the patients surgery. 03-09-22: Second call received from a different staff member at [facility] pre-admission testing with the same request for records. I reviewed the phone call I had with Job Mcneil the day before and the answer [facility] (and I) received from Dr. Sarah Haddad office. The staff member agreed to contact Dr. Sarah Haddad office to request the information they need for the patient.  Yan Vaz RN

## 2022-03-18 ENCOUNTER — HOSPITAL ENCOUNTER (EMERGENCY)
Age: 22
Discharge: HOME | End: 2022-03-18
Attending: EMERGENCY MEDICINE

## 2022-03-18 ENCOUNTER — APPOINTMENT (OUTPATIENT)
Dept: CT IMAGING | Age: 22
End: 2022-03-18
Attending: NURSE PRACTITIONER

## 2022-03-18 VITALS
OXYGEN SATURATION: 99 % | WEIGHT: 115 LBS | DIASTOLIC BLOOD PRESSURE: 63 MMHG | BODY MASS INDEX: 21.71 KG/M2 | SYSTOLIC BLOOD PRESSURE: 101 MMHG | TEMPERATURE: 97.8 F | HEART RATE: 108 BPM | HEIGHT: 61 IN | RESPIRATION RATE: 16 BRPM

## 2022-03-18 DIAGNOSIS — Z91.89 POTENTIAL FOR FOOD POISONING: ICD-10-CM

## 2022-03-18 DIAGNOSIS — R10.31 ABDOMINAL PAIN, RIGHT LOWER QUADRANT: Primary | ICD-10-CM

## 2022-03-18 PROBLEM — Z87.59 HISTORY OF MISCARRIAGE: Status: ACTIVE | Noted: 2018-01-28

## 2022-03-18 LAB
ALBUMIN SERPL-MCNC: 3.8 G/DL (ref 3.5–5)
ALBUMIN/GLOB SERPL: 0.9 {RATIO} (ref 1.1–2.2)
ALP SERPL-CCNC: 60 U/L (ref 45–117)
ALT SERPL-CCNC: 27 U/L (ref 12–78)
ANION GAP SERPL CALC-SCNC: 5 MMOL/L (ref 5–15)
APPEARANCE UR: ABNORMAL
AST SERPL-CCNC: 21 U/L (ref 15–37)
BACTERIA URNS QL MICRO: ABNORMAL /HPF
BASOPHILS # BLD: 0.1 K/UL (ref 0–0.1)
BASOPHILS NFR BLD: 0 % (ref 0–1)
BILIRUB SERPL-MCNC: 0.6 MG/DL (ref 0.2–1)
BILIRUB UR QL: NEGATIVE
BUN SERPL-MCNC: 21 MG/DL (ref 6–20)
BUN/CREAT SERPL: 23 (ref 12–20)
CALCIUM SERPL-MCNC: 9 MG/DL (ref 8.5–10.1)
CHLORIDE SERPL-SCNC: 103 MMOL/L (ref 97–108)
CO2 SERPL-SCNC: 27 MMOL/L (ref 21–32)
COLOR UR: ABNORMAL
CREAT SERPL-MCNC: 0.92 MG/DL (ref 0.55–1.02)
DIFFERENTIAL METHOD BLD: ABNORMAL
EOSINOPHIL # BLD: 0 K/UL (ref 0–0.4)
EOSINOPHIL NFR BLD: 0 % (ref 0–7)
EPITH CASTS URNS QL MICRO: ABNORMAL /LPF
ERYTHROCYTE [DISTWIDTH] IN BLOOD BY AUTOMATED COUNT: 13.1 % (ref 11.5–14.5)
GLOBULIN SER CALC-MCNC: 4.2 G/DL (ref 2–4)
GLUCOSE SERPL-MCNC: 86 MG/DL (ref 65–100)
GLUCOSE UR STRIP.AUTO-MCNC: NEGATIVE MG/DL
HCG UR QL: NEGATIVE
HCT VFR BLD AUTO: 44.1 % (ref 35–47)
HGB BLD-MCNC: 14.5 G/DL (ref 11.5–16)
HGB UR QL STRIP: NEGATIVE
HYALINE CASTS URNS QL MICRO: ABNORMAL /LPF (ref 0–2)
IMM GRANULOCYTES # BLD AUTO: 0.1 K/UL (ref 0–0.04)
IMM GRANULOCYTES NFR BLD AUTO: 0 % (ref 0–0.5)
KETONES UR QL STRIP.AUTO: NEGATIVE MG/DL
LEUKOCYTE ESTERASE UR QL STRIP.AUTO: NEGATIVE
LYMPHOCYTES # BLD: 0.9 K/UL (ref 0.8–3.5)
LYMPHOCYTES NFR BLD: 6 % (ref 12–49)
MCH RBC QN AUTO: 29.7 PG (ref 26–34)
MCHC RBC AUTO-ENTMCNC: 32.9 G/DL (ref 30–36.5)
MCV RBC AUTO: 90.4 FL (ref 80–99)
MONOCYTES # BLD: 0.8 K/UL (ref 0–1)
MONOCYTES NFR BLD: 5 % (ref 5–13)
NEUTS SEG # BLD: 12.9 K/UL (ref 1.8–8)
NEUTS SEG NFR BLD: 89 % (ref 32–75)
NITRITE UR QL STRIP.AUTO: NEGATIVE
NRBC # BLD: 0 K/UL (ref 0–0.01)
NRBC BLD-RTO: 0 PER 100 WBC
PH UR STRIP: 6 [PH] (ref 5–8)
PLATELET # BLD AUTO: 337 K/UL (ref 150–400)
PMV BLD AUTO: 9.7 FL (ref 8.9–12.9)
POTASSIUM SERPL-SCNC: 3.7 MMOL/L (ref 3.5–5.1)
PROT SERPL-MCNC: 8 G/DL (ref 6.4–8.2)
PROT UR STRIP-MCNC: NEGATIVE MG/DL
RBC # BLD AUTO: 4.88 M/UL (ref 3.8–5.2)
RBC #/AREA URNS HPF: ABNORMAL /HPF (ref 0–5)
SODIUM SERPL-SCNC: 135 MMOL/L (ref 136–145)
SP GR UR REFRACTOMETRY: 1.02 (ref 1–1.03)
UROBILINOGEN UR QL STRIP.AUTO: 1 EU/DL (ref 0.2–1)
WBC # BLD AUTO: 14.7 K/UL (ref 3.6–11)
WBC URNS QL MICRO: ABNORMAL /HPF (ref 0–4)

## 2022-03-18 PROCEDURE — 96374 THER/PROPH/DIAG INJ IV PUSH: CPT

## 2022-03-18 PROCEDURE — 74011250636 HC RX REV CODE- 250/636: Performed by: NURSE PRACTITIONER

## 2022-03-18 PROCEDURE — 99285 EMERGENCY DEPT VISIT HI MDM: CPT

## 2022-03-18 PROCEDURE — 80053 COMPREHEN METABOLIC PANEL: CPT

## 2022-03-18 PROCEDURE — 81001 URINALYSIS AUTO W/SCOPE: CPT

## 2022-03-18 PROCEDURE — 74011000636 HC RX REV CODE- 636: Performed by: RADIOLOGY

## 2022-03-18 PROCEDURE — 81025 URINE PREGNANCY TEST: CPT

## 2022-03-18 PROCEDURE — 99284 EMERGENCY DEPT VISIT MOD MDM: CPT

## 2022-03-18 PROCEDURE — 85025 COMPLETE CBC W/AUTO DIFF WBC: CPT

## 2022-03-18 PROCEDURE — 74177 CT ABD & PELVIS W/CONTRAST: CPT

## 2022-03-18 PROCEDURE — 36415 COLL VENOUS BLD VENIPUNCTURE: CPT

## 2022-03-18 RX ORDER — ONDANSETRON 4 MG/1
4 TABLET, ORALLY DISINTEGRATING ORAL
Qty: 12 TABLET | Refills: 0 | Status: SHIPPED | OUTPATIENT
Start: 2022-03-18

## 2022-03-18 RX ORDER — ONDANSETRON 2 MG/ML
4 INJECTION INTRAMUSCULAR; INTRAVENOUS
Status: COMPLETED | OUTPATIENT
Start: 2022-03-18 | End: 2022-03-18

## 2022-03-18 RX ADMIN — ONDANSETRON 4 MG: 2 INJECTION INTRAMUSCULAR; INTRAVENOUS at 16:01

## 2022-03-18 RX ADMIN — SODIUM CHLORIDE 1000 ML: 9 INJECTION, SOLUTION INTRAVENOUS at 15:53

## 2022-03-18 NOTE — ED TRIAGE NOTES
Pt to ED for poss food poisoning after eating \"bad shrimp\" yesterday, sx began last night including emesis, diarrhea, abd pain, headache, dizziness and unable to tolerate PO food

## 2022-03-18 NOTE — ED PROVIDER NOTES
This is a 77-year-old female who presents ambulatory to the emergency room with complaints of questionable food poisoning. Patient states she went out last night and had shrimp that was \"out too long. \"  Patient states she ate the entire plate but then at the very last piece had to spit it out secondary to a \"fishy taste. \"  Since then patient has had multiple episodes of nausea, vomiting, abdominal pain and cramping, now with the inability to tolerate p.o. and right lower quadrant pain. Patient denies any chest pain, shortness of breath, dizziness, chills. Patient adds that she did have 100 degree temperature at home. Noted to be 98.4 in triage. Also is tachycardic in the 110's. There are no further complaints at this time. Flora Delarosa NP    Past Medical History:  No date: Asthma  No date: Hepatitis B  01/28/2018: History of miscarriage  No date: History of rashes as a child  No past surgical history on file. Past Medical History:   Diagnosis Date    Asthma     Hepatitis B     History of miscarriage 01/28/2018    History of rashes as a child        No past surgical history on file.       Family History:   Problem Relation Age of Onset    Other Mother         ovarian cysts    Other Sister         ovarian cysts, on bcp       Social History     Socioeconomic History    Marital status: SINGLE     Spouse name: Not on file    Number of children: Not on file    Years of education: Not on file    Highest education level: Not on file   Occupational History    Not on file   Tobacco Use    Smoking status: Never Smoker    Smokeless tobacco: Never Used   Vaping Use    Vaping Use: Never used   Substance and Sexual Activity    Alcohol use: No    Drug use: No    Sexual activity: Never     Birth control/protection: None   Other Topics Concern    Not on file   Social History Narrative    Not on file     Social Determinants of Health     Financial Resource Strain:     Difficulty of Paying Living Expenses: Not on file   Food Insecurity:     Worried About [address] [employer] in the Last Year: Not on file    [place] of Food in the Last Year: Not on file   Transportation Needs:     Lack of Transportation (Medical): Not on file    Lack of Transportation (Non-Medical): Not on file   Physical Activity:     Days of Exercise per Week: Not on file    Minutes of Exercise per Session: Not on file   Stress:     Feeling of Stress : Not on file   Social Connections:     Frequency of Communication with Friends and Family: Not on file    Frequency of Social Gatherings with Friends and Family: Not on file    Attends Zoroastrian Services: Not on file    Active Member of [address] [place] [address] or Organizations: Not on file    Attends Club or Organization Meetings: Not on file    Marital Status: Not on file   Intimate Partner Violence:     Fear of Current or Ex-Partner: Not on file    Emotionally Abused: Not on file    Physically Abused: Not on file    Sexually Abused: Not on file   Housing Stability:     Unable to Pay for Housing in the Last Year: Not on file    Number of [place] in the Last Year: Not on file    Unstable Housing in the Last Year: Not on file         ALLERGIES: Patient has no known allergies. Review of Systems   Constitutional: Negative for appetite change, chills, diaphoresis, fatigue and fever. HENT: Negative for congestion and trouble swallowing. Eyes: Negative for photophobia, pain, redness and visual disturbance. Respiratory: Negative for chest tightness, shortness of breath and wheezing. Cardiovascular: Negative for chest pain and palpitations. Gastrointestinal: Positive for abdominal pain, nausea and vomiting. Negative for abdominal distention. Endocrine: Negative. Genitourinary: Negative for difficulty urinating, flank pain, frequency and urgency. Musculoskeletal: Negative for back pain, neck pain and neck stiffness.    Skin: Negative for color change, pallor, rash and wound. Allergic/Immunologic: Negative. Neurological: Negative for dizziness, speech difficulty, weakness and headaches. Hematological: Does not bruise/bleed easily. Psychiatric/Behavioral: Negative for behavioral problems. The patient is not nervous/anxious. Vitals:    03/18/22 1307   BP: 105/68   Pulse: (!) 115   Resp: 16   Temp: 98.4 °F (36.9 °C)   SpO2: 98%   Weight: 52.2 kg (115 lb)   Height: 5' 1\" (1.549 m)            Physical Exam  Vitals and nursing note reviewed. Constitutional:       General: She is not in acute distress. Appearance: Normal appearance. She is well-developed. She is not ill-appearing. HENT:      Head: Normocephalic and atraumatic. Right Ear: External ear normal.      Left Ear: External ear normal.      Nose: Nose normal. No congestion. Mouth/Throat:      Mouth: Mucous membranes are moist.   Eyes:      General:         Right eye: No discharge. Left eye: No discharge. Conjunctiva/sclera: Conjunctivae normal.      Pupils: Pupils are equal, round, and reactive to light. Neck:      Vascular: No JVD. Trachea: No tracheal deviation. Cardiovascular:      Rate and Rhythm: Regular rhythm. Tachycardia present. Pulses: Normal pulses. Heart sounds: Normal heart sounds. No murmur heard. No gallop. Pulmonary:      Effort: Pulmonary effort is normal. No respiratory distress. Breath sounds: Normal breath sounds. No wheezing or rales. Chest:      Chest wall: No tenderness. Abdominal:      General: Bowel sounds are normal. There is no distension. Palpations: Abdomen is soft. Tenderness: There is abdominal tenderness in the right lower quadrant. There is no guarding or rebound. Genitourinary:     Comments: Negative    Musculoskeletal:         General: No tenderness. Normal range of motion. Cervical back: Normal range of motion and neck supple. Skin:     General: Skin is warm and dry.       Capillary Refill: Capillary refill takes less than 2 seconds. Coloration: Skin is not pale. Findings: No erythema or rash. Neurological:      General: No focal deficit present. Mental Status: She is alert and oriented to person, place, and time. Motor: No weakness. Coordination: Coordination normal.   Psychiatric:         Mood and Affect: Mood normal.         Behavior: Behavior normal.         Thought Content: Thought content normal.         Judgment: Judgment normal.          MDM  Number of Diagnoses or Management Options  Diagnosis management comments: Differential diagnosis includes food poisoning, appendicitis, dehydration and others. Amount and/or Complexity of Data Reviewed  Clinical lab tests: ordered  Tests in the radiology section of CPT®: ordered         4:16 PM  Change of shift. Care of patient signed over to Giovana Wiley. Bedside handoff complete. Awaiting Labs, imaging and po challenge if appropriate.      Procedures

## 2022-03-19 NOTE — ED NOTES
Patient reexamined. Patient states improvement of symptoms. CT abdomen pelvis showed no acute abnormality. Symptoms likely related to food poisoning. Patient discharged with Zofran as needed for nausea vomiting. Patient stable upon discharge. Patient agreed to plan of care.   Return precautions given to patient    MORTEZA Archer

## 2022-03-21 ENCOUNTER — PATIENT OUTREACH (OUTPATIENT)
Dept: FAMILY MEDICINE CLINIC | Age: 22
End: 2022-03-21

## 2022-03-21 NOTE — PROGRESS NOTES
ED follow up note: [facility] 3/18/22 ; RLQ pain w/ n/v; potential for food poisoning    WBC: 14.7  HR: 108-115    CT Abdomen: NL  Rx: Zofran    Pt is 5 days post op , removal of ganglion cyst.    Call to patient who speaks [place]. Pt reports that she still does not feel well but with further questioning, her sxs have improved. Pt relays story that she is sure she had shrimp that were bad. She and her friend that ate some of the same shrimp had sudden onset of symptoms like hers. Denies vomiting since the day she went to ED  Denies fever  c/o intermittent nausea and diarrhea. Pt tells me she has had cereal, rice and beans so far today, no vomiting. She is drinking water and gatorade. Reviewed clear liq and BRAT diet recommendations      Denies dysuria, frequency or pelvic pain. Patient denies and redness, edema or drainage from her hand incision. Pt was taking Zofran x 2d, then her dad accidentally threw away the bag it was in. Offered to schedule pcp appt. Pt and NN agreed that if she is not completely better in 2-3 days, she will call nn for an appt at the end of the week with pcp. If worsening of sxs, will rt ED or call NN for an appt. Do Givens

## 2022-06-16 RX ORDER — HYDROCODONE BITARTRATE AND ACETAMINOPHEN 5; 325 MG/1; MG/1
TABLET ORAL
COMMUNITY
Start: 2022-03-17

## 2022-06-17 ENCOUNTER — HOSPITAL ENCOUNTER (EMERGENCY)
Age: 22
Discharge: HOME | End: 2022-06-17
Attending: EMERGENCY MEDICINE

## 2022-06-17 ENCOUNTER — OFFICE VISIT (OUTPATIENT)
Dept: FAMILY MEDICINE CLINIC | Age: 22
End: 2022-06-17

## 2022-06-17 ENCOUNTER — APPOINTMENT (OUTPATIENT)
Dept: ULTRASOUND IMAGING | Age: 22
End: 2022-06-17
Attending: EMERGENCY MEDICINE

## 2022-06-17 ENCOUNTER — APPOINTMENT (OUTPATIENT)
Dept: CT IMAGING | Age: 22
End: 2022-06-17
Attending: EMERGENCY MEDICINE

## 2022-06-17 VITALS
RESPIRATION RATE: 17 BRPM | WEIGHT: 115.4 LBS | SYSTOLIC BLOOD PRESSURE: 91 MMHG | BODY MASS INDEX: 21.79 KG/M2 | OXYGEN SATURATION: 100 % | HEART RATE: 81 BPM | HEIGHT: 61 IN | TEMPERATURE: 98.1 F | DIASTOLIC BLOOD PRESSURE: 62 MMHG

## 2022-06-17 VITALS
OXYGEN SATURATION: 100 % | HEART RATE: 74 BPM | SYSTOLIC BLOOD PRESSURE: 105 MMHG | TEMPERATURE: 98.1 F | RESPIRATION RATE: 16 BRPM | BODY MASS INDEX: 21.71 KG/M2 | DIASTOLIC BLOOD PRESSURE: 66 MMHG | HEIGHT: 61 IN | WEIGHT: 115 LBS

## 2022-06-17 DIAGNOSIS — R10.31 RIGHT LOWER QUADRANT ABDOMINAL PAIN: Primary | ICD-10-CM

## 2022-06-17 DIAGNOSIS — R10.31 ABDOMINAL PAIN, RIGHT LOWER QUADRANT: Primary | ICD-10-CM

## 2022-06-17 DIAGNOSIS — H65.91 RIGHT NON-SUPPURATIVE OTITIS MEDIA: ICD-10-CM

## 2022-06-17 LAB
ALBUMIN SERPL-MCNC: 3.8 G/DL (ref 3.5–5)
ALBUMIN/GLOB SERPL: 0.9 {RATIO} (ref 1.1–2.2)
ALP SERPL-CCNC: 59 U/L (ref 45–117)
ALT SERPL-CCNC: 22 U/L (ref 12–78)
ANION GAP SERPL CALC-SCNC: 6 MMOL/L (ref 5–15)
APPEARANCE UR: CLEAR
AST SERPL-CCNC: 19 U/L (ref 15–37)
BACTERIA URNS QL MICRO: ABNORMAL /HPF
BASOPHILS # BLD: 0.1 K/UL (ref 0–0.1)
BASOPHILS NFR BLD: 1 % (ref 0–1)
BILIRUB SERPL-MCNC: 0.4 MG/DL (ref 0.2–1)
BILIRUB UR QL: NEGATIVE
BUN SERPL-MCNC: 13 MG/DL (ref 6–20)
BUN/CREAT SERPL: 14 (ref 12–20)
CALCIUM SERPL-MCNC: 9.6 MG/DL (ref 8.5–10.1)
CHLORIDE SERPL-SCNC: 106 MMOL/L (ref 97–108)
CO2 SERPL-SCNC: 27 MMOL/L (ref 21–32)
COLOR UR: ABNORMAL
CREAT SERPL-MCNC: 0.96 MG/DL (ref 0.55–1.02)
DIFFERENTIAL METHOD BLD: NORMAL
EOSINOPHIL # BLD: 0.1 K/UL (ref 0–0.4)
EOSINOPHIL NFR BLD: 1 % (ref 0–7)
EPITH CASTS URNS QL MICRO: ABNORMAL /LPF
ERYTHROCYTE [DISTWIDTH] IN BLOOD BY AUTOMATED COUNT: 12.7 % (ref 11.5–14.5)
GLOBULIN SER CALC-MCNC: 4.1 G/DL (ref 2–4)
GLUCOSE SERPL-MCNC: 91 MG/DL (ref 65–100)
GLUCOSE UR STRIP.AUTO-MCNC: NEGATIVE MG/DL
HCG UR QL: NEGATIVE
HCT VFR BLD AUTO: 37.5 % (ref 35–47)
HGB BLD-MCNC: 12.7 G/DL (ref 11.5–16)
HGB UR QL STRIP: NEGATIVE
HYALINE CASTS URNS QL MICRO: ABNORMAL /LPF (ref 0–2)
IMM GRANULOCYTES # BLD AUTO: 0 K/UL (ref 0–0.04)
IMM GRANULOCYTES NFR BLD AUTO: 0 % (ref 0–0.5)
KETONES UR QL STRIP.AUTO: NEGATIVE MG/DL
LEUKOCYTE ESTERASE UR QL STRIP.AUTO: NEGATIVE
LIPASE SERPL-CCNC: 100 U/L (ref 73–393)
LYMPHOCYTES # BLD: 3 K/UL (ref 0.8–3.5)
LYMPHOCYTES NFR BLD: 32 % (ref 12–49)
MCH RBC QN AUTO: 29.8 PG (ref 26–34)
MCHC RBC AUTO-ENTMCNC: 33.9 G/DL (ref 30–36.5)
MCV RBC AUTO: 88 FL (ref 80–99)
MONOCYTES # BLD: 0.8 K/UL (ref 0–1)
MONOCYTES NFR BLD: 8 % (ref 5–13)
NEUTS SEG # BLD: 5.2 K/UL (ref 1.8–8)
NEUTS SEG NFR BLD: 58 % (ref 32–75)
NITRITE UR QL STRIP.AUTO: NEGATIVE
NRBC # BLD: 0 K/UL (ref 0–0.01)
NRBC BLD-RTO: 0 PER 100 WBC
PH UR STRIP: 6.5 [PH] (ref 5–8)
PLATELET # BLD AUTO: 313 K/UL (ref 150–400)
PMV BLD AUTO: 9.7 FL (ref 8.9–12.9)
POTASSIUM SERPL-SCNC: 3.6 MMOL/L (ref 3.5–5.1)
PROT SERPL-MCNC: 7.9 G/DL (ref 6.4–8.2)
PROT UR STRIP-MCNC: 30 MG/DL
RBC # BLD AUTO: 4.26 M/UL (ref 3.8–5.2)
RBC #/AREA URNS HPF: ABNORMAL /HPF (ref 0–5)
SODIUM SERPL-SCNC: 139 MMOL/L (ref 136–145)
SP GR UR REFRACTOMETRY: 1.02 (ref 1–1.03)
UR CULT HOLD, URHOLD: NORMAL
UROBILINOGEN UR QL STRIP.AUTO: 1 EU/DL (ref 0.2–1)
WBC # BLD AUTO: 9.2 K/UL (ref 3.6–11)
WBC URNS QL MICRO: ABNORMAL /HPF (ref 0–4)

## 2022-06-17 PROCEDURE — 74177 CT ABD & PELVIS W/CONTRAST: CPT

## 2022-06-17 PROCEDURE — 99214 OFFICE O/P EST MOD 30 MIN: CPT | Performed by: NURSE PRACTITIONER

## 2022-06-17 PROCEDURE — 83690 ASSAY OF LIPASE: CPT

## 2022-06-17 PROCEDURE — 80053 COMPREHEN METABOLIC PANEL: CPT

## 2022-06-17 PROCEDURE — 81025 URINE PREGNANCY TEST: CPT

## 2022-06-17 PROCEDURE — 74011000636 HC RX REV CODE- 636: Performed by: RADIOLOGY

## 2022-06-17 PROCEDURE — 76830 TRANSVAGINAL US NON-OB: CPT

## 2022-06-17 PROCEDURE — 99285 EMERGENCY DEPT VISIT HI MDM: CPT

## 2022-06-17 PROCEDURE — 36415 COLL VENOUS BLD VENIPUNCTURE: CPT

## 2022-06-17 PROCEDURE — 85025 COMPLETE CBC W/AUTO DIFF WBC: CPT

## 2022-06-17 PROCEDURE — 81001 URINALYSIS AUTO W/SCOPE: CPT

## 2022-06-17 RX ORDER — AMOXICILLIN AND CLAVULANATE POTASSIUM 875; 125 MG/1; MG/1
1 TABLET, FILM COATED ORAL 2 TIMES DAILY
Qty: 14 TABLET | Refills: 0 | Status: SHIPPED | OUTPATIENT
Start: 2022-06-17

## 2022-06-17 RX ADMIN — IOPAMIDOL 100 ML: 755 INJECTION, SOLUTION INTRAVENOUS at 18:20

## 2022-06-17 NOTE — ED NOTES
3:56 PM  Change of shift. Care of patient taken over from [employer], NP; H&P reviewed, bedside handoff complete. Awaiting US, bloodwork, CT results. 4:46 PM  Labs and urine unremarkable. Transvaginal US revealed small left ovarian hemorrhagic cyst but otherwise no acute abnormalities. Patient reports upon reevaluation that she has been having muffled hearing in her right ear and right ear otalgia. On exam noted erythema of right tympanic membrane with bulging and reduced light reflex. Suspect otitis media. Patient will be discharged with prescription for antibiotic.    6:39 PM  CT of abdomen pelvis showed no acute intraperitoneal process. Did note a liver hemangioma do not suspect that this is connected to symptoms. Patient will be discharged with instructions for conservative care, follow-up and return precautions.

## 2022-06-17 NOTE — ED PROVIDER NOTES
HPI patient is a 60-year-old  female with past medical history significant for asthma and hepatitis B who presents to the ED with intermittent right lower quadrant abdominal pain for 2 weeks. Her last menstrual period was May 27, 2022. ([place]). She was evaluated at the [facility] today and sent to the ED to rule out ovarian cyst, torsion and appendicitis. She has not had any pain medications today prior to arrival.  When she has the pain she feels better in a fetal position. Presently she is not in pain. Denies fever, cold symptoms, headache, neck pain, visual changes, focal weakness or rash. Denies any difficulty breathing, difficulty swallowing, SOB or chest pain. Denies any nausea, vomiting, urinary symptoms, constipation or diarrhea. She denies any vaginal bleeding or vaginal discharge. Pt. Reports that she last ate around 12noon. Pt reports that she had a miscarriage in 2017. She is not taking or on any birthcontrol. Past Medical History:   Diagnosis Date    Asthma     Hepatitis B     History of miscarriage 01/28/2018    History of rashes as a child        Past Surgical History:   Procedure Laterality Date    HX CYST REMOVAL Left     Cyst removal of left hand.           Family History:   Problem Relation Age of Onset    Other Mother         ovarian cysts    Thyroid Disease Mother     Kidney Disease Mother     Other Sister         ovarian cysts, on bcp    Heart Disease Father     High Cholesterol Father     Arthritis Father        Social History     Socioeconomic History    Marital status: SINGLE     Spouse name: Not on file    Number of children: Not on file    Years of education: Not on file    Highest education level: Not on file   Occupational History    Not on file   Tobacco Use    Smoking status: Never Smoker    Smokeless tobacco: Never Used   Vaping Use    Vaping Use: Never used   Substance and Sexual Activity    Alcohol use: No    Drug use: No    Sexual activity: Yes     Partners: Male     Birth control/protection: None   Other Topics Concern    Not on file   Social History Narrative    Not on file     Social Determinants of Health     Financial Resource Strain:     Difficulty of Paying Living Expenses: Not on file   Food Insecurity:     Worried About Running Out of Food in the Last Year: Not on file    [place] of Food in the Last Year: Not on file   Transportation Needs:     Lack of Transportation (Medical): Not on file    Lack of Transportation (Non-Medical): Not on file   Physical Activity:     Days of Exercise per Week: Not on file    Minutes of Exercise per Session: Not on file   Stress:     Feeling of Stress : Not on file   Social Connections:     Frequency of Communication with Friends and Family: Not on file    Frequency of Social Gatherings with Friends and Family: Not on file    Attends Yazdanism Services: Not on file    Active Member of [address] [place] [address] [employer] or Organizations: Not on file    Attends Club or Organization Meetings: Not on file    Marital Status: Not on file   Intimate Partner Violence:     Fear of Current or Ex-Partner: Not on file    Emotionally Abused: Not on file    Physically Abused: Not on file    Sexually Abused: Not on file   Housing Stability:     Unable to Pay for Housing in the Last Year: Not on file    Number of [place] in the Last Year: Not on file    Unstable Housing in the Last Year: Not on file         ALLERGIES: Patient has no known allergies. Review of Systems   Constitutional: Negative for activity change, appetite change, fever and unexpected weight change. HENT: Negative for congestion, rhinorrhea, sore throat and trouble swallowing. Eyes: Negative for visual disturbance. Respiratory: Negative for cough and shortness of breath. Cardiovascular: Negative for chest pain, palpitations and leg swelling. Gastrointestinal: Positive for abdominal pain. Negative for diarrhea and nausea.    Genitourinary: Positive for menstrual problem. Negative for dysuria. Musculoskeletal: Negative for back pain and neck pain. Skin: Negative for rash. Neurological: Negative for headaches. All other systems reviewed and are negative. Vitals:    06/17/22 1523   BP: 105/66   Pulse: 74   Resp: 16   Temp: 98.1 °F (36.7 °C)   SpO2: 100%   Weight: 52.2 kg (115 lb)   Height: 5' 1\" (1.549 m)            Physical Exam  Vitals and nursing note reviewed. Constitutional:       General: She is not in acute distress. Appearance: She is well-developed and normal weight. She is not ill-appearing, toxic-appearing or diaphoretic. Comments:  female, non-smoker, presently unemployed   HENT:      Head: Normocephalic. Mouth/Throat:      Mouth: Mucous membranes are moist.      Pharynx: No oropharyngeal exudate. Cardiovascular:      Rate and Rhythm: Normal rate and regular rhythm. Pulmonary:      Effort: Pulmonary effort is normal.      Breath sounds: Normal breath sounds. Abdominal:      General: Bowel sounds are normal. There is no distension. There are no signs of injury. Palpations: Abdomen is soft. Tenderness: There is abdominal tenderness in the right lower quadrant. There is no guarding or rebound. Negative signs include Abreu's sign. Hernia: No hernia is present. Skin:     General: Skin is warm and dry. Neurological:      Mental Status: She is alert. MDM       Procedures      Labs Reviewed   URINALYSIS W/MICROSCOPIC - Abnormal; Notable for the following components:       Result Value    Protein 30 (*)     Epithelial cells MANY (*)     Bacteria 2+ (*)     All other components within normal limits   URINE CULTURE HOLD SAMPLE   CBC WITH AUTOMATED DIFF   METABOLIC PANEL, COMPREHENSIVE   LIPASE     4:18 PM  Change of shift. Care of patient signed over to [facility] Bedside handoff complete.  Awaiting ultrasound and CT abdomen and pelvis with contrast. Magnus Main NP

## 2022-06-17 NOTE — PROGRESS NOTES
Re-took pt's BP and temperature - please see updated VS.     Gave pt ED referral slip written by Angela Rodgers NP and instructed pt to go to [facility] ED (closest to pt's house). Pt's boyfriend to drive. Pt ambulated out of office A&Ox4, GCS 15 w/ steady gait, A&Ox4, GCS 15. Attempted to call [facility] ED x2 to give report, could not get through.

## 2022-06-17 NOTE — PROGRESS NOTES
Room:     Identified pt with two pt identifiers(name and ). Reviewed record in preparation for visit and have obtained necessary documentation. All patient medications has been reviewed. Chief Complaint   Patient presents with    Vaginal Pain     Pt stated she has sharp uterus pain for 2 weeks.      LOW BACK PAIN    Abdominal Cramping    Ear Pain     Right       Health Maintenance Due   Topic    COVID-19 Vaccine (1)       Vitals:    22 1022   BP: (!) 68/50   Pulse: 81   Resp: 17   Temp: (!) 93.4 °F (34.1 °C)   SpO2: 100%   Weight: 115 lb 6.4 oz (52.3 kg)   Height: 5' 1\" (1.549 m)   PainSc:   6   PainLoc: Vagina   LMP: 2022

## 2022-06-17 NOTE — ED TRIAGE NOTES
Pt reports lower abd pain ongoing for 2 weeks. Reports heavier period last month. Denies vaginal bleeding or discharge.

## 2022-06-17 NOTE — PROGRESS NOTES
[address] (: 2000) is a 25 y.o. female, established patient, here for evaluation of the following chief complaint(s):  Vaginal Pain (Pt stated she has sharp uterus pain for 2 weeks. ), LOW BACK PAIN, Abdominal Cramping, and Ear Pain (Right)     ASSESSMENT/PLAN:  Below is the assessment and plan developed based on review of pertinent history, physical exam, labs, studies, and medications. 1. Right lower quadrant abdominal pain  -     REFERRAL TO EMERGENCY DEPARTMENT    Return for Trinh Ag following ED visit. SUBJECTIVE/OBJECTIVE:  HPI  Stabbing pain, constant, random. Shocks her, fetal position helps. N/v/f? no  Bleeding? No  Has been on the left or the right side. Last period was normal.  Periods have become very painful. Since we stopped the birth control pill which was in . Having large clots of blood with her periods. Drinks a tea with cinnamon which helps. Especially hurts after intercourse. It is so painful that she needs to get into a fetal position and hold her lower stomach. Taking vitamins \"to try to conceive\". No results found for any visits on 22. Review of Systems: Negative for: fever, chest pain, shortness of breath, leg swelling. Social History:  reports that she has never smoked. She has never used smokeless tobacco. She reports that she does not drink alcohol and does not use drugs. Current Medications:   Current Outpatient Medications   Medication Sig    albuterol (PROVENTIL HFA, VENTOLIN HFA, PROAIR HFA) 90 mcg/actuation inhaler Take 2 Puffs by inhalation every four (4) hours as needed for Wheezing.  HYDROcodone-acetaminophen (NORCO) 5-325 mg per tablet TAKE 1 TABLET BY MOUTH EVERY 6 HOURS AS NEEDED (Patient not taking: Reported on 2022)    ondansetron (ZOFRAN ODT) 4 mg disintegrating tablet Take 1 Tablet by mouth every eight (8) hours as needed for Nausea or Vomiting.  (Patient not taking: Reported on 2022) Physical Examination: Patient's last menstrual period was 05/27/2022 (exact date). Blood pressure (!) 68/50, pulse 81, temperature (!) 93.4 °F (34.1 °C), resp. rate 17, height 5' 1\" (1.549 m), weight 115 lb 6.4 oz (52.3 kg), last menstrual period 05/27/2022, SpO2 100 %. General appearance - well developed, no acute distress. Chest - clear to auscultation. Heart - regular rate and rhythm without murmurs, rubs, or gallops. Abdomen - bowel sounds present x 4, extreme tenderness RLQ, no rebound tenderness. Mild tenderness LLQ. Extremities - no CCE. An electronic signature was used to authenticate this note.   -- Kenneth Aggarwal NP

## 2022-06-23 ENCOUNTER — OFFICE VISIT (OUTPATIENT)
Dept: FAMILY MEDICINE CLINIC | Age: 22
End: 2022-06-23

## 2022-06-23 VITALS
OXYGEN SATURATION: 99 % | TEMPERATURE: 97.3 F | HEART RATE: 67 BPM | DIASTOLIC BLOOD PRESSURE: 60 MMHG | SYSTOLIC BLOOD PRESSURE: 92 MMHG | WEIGHT: 115 LBS | BODY MASS INDEX: 21.71 KG/M2 | HEIGHT: 61 IN

## 2022-06-23 DIAGNOSIS — I80.8 SUPERFICIAL THROMBOPHLEBITIS OF LEFT UPPER EXTREMITY: Primary | ICD-10-CM

## 2022-06-23 DIAGNOSIS — D18.03 LIVER HEMANGIOMA: ICD-10-CM

## 2022-06-23 DIAGNOSIS — N83.202 CYST OF LEFT OVARY: ICD-10-CM

## 2022-06-23 PROCEDURE — 99214 OFFICE O/P EST MOD 30 MIN: CPT | Performed by: NURSE PRACTITIONER

## 2022-06-23 RX ORDER — NAPROXEN 500 MG/1
500 TABLET ORAL 2 TIMES DAILY WITH MEALS
Qty: 60 TABLET | Refills: 0 | Status: SHIPPED | OUTPATIENT
Start: 2022-06-23

## 2022-06-23 NOTE — PROGRESS NOTES
Coordination of Care  1. Have you been to the ER, urgent care clinic since your last visit? Hospitalized since your last visit? Yes When: [facility],06/17/2022, abdominal pain    2. Have you seen or consulted any other health care providers outside of the [address] [place] [address] since your last visit? Include any pap smears or colon screening. No    Does the patient need refills? NO    Learning Assessment Complete?  yes  Depression Screening complete in the past 12 months? yes

## 2022-06-23 NOTE — PROGRESS NOTES
I have printed AVS and reviewed it with patient today. Patient verbalized understanding. I reviewed with patient medications sent to pharmacy and how the medication is taken. Patient verbalized understanding. The patient was given coupons for prescriptions and I explained how the coupons are used. Patient verbalized understanding. I explained the process for Access Now to the patient and explained to the patient that she will receive a call to start the Access Now process. Patient verbalized understanding. I assisted the patient with scheduling the abdominal ultra sound per the provider's order. The patient was given information for [facility] and I explained how to locate the ED for registation for a test on Saturday. Patient verbalized understanding. I reviewed the information for ice to the arm with the patient. Patient verbalized understanding. Patient correctly stated her full name and date of birth prior to the information shared. No  was needed.  Jose E Savaedra RN

## 2022-06-23 NOTE — PROGRESS NOTES
2022 : Caprice Marquez (: 2000) is a 25 y.o. female, established patient, here for evaluation of the following chief complaint(s):  Abdominal Pain (ED visit f/u) and Arm swelling (patient c/o pain after contrast IV for MRI per patient)   -she mentioned brown spots on her chest, hands, has not been in the sun  ASSESSMENT/PLAN:  Below is the assessment and plan developed based on review of pertinent history, physical exam, labs, studies, and medications. 1. Superficial thrombophlebitis of left upper extremity  -     naproxen (NAPROSYN) 500 mg tablet; Take 1 Tablet by mouth two (2) times daily (with meals). , Normal, Disp-60 Tablet, R-0  2. Cyst of left ovary  -     REFERRAL TO GYNECOLOGY  3. Liver hemangioma  -     US ABD COMP; Future    Return for 2-3 weeks F2F LK (arm). -ice    SUBJECTIVE/OBJECTIVE:  HPI   Abdominal pain  Pain was on the right. Usually starts on the left, then moves to the right. Sharp stabbing before the periods. Her periods have been more painful for the last 2 weeks. No birth control. Arm swelling  Left arm is swollen and cold. No results found for any visits on 22. Review of Systems: Negative for: fever, chest pain, shortness of breath, leg swelling. Social History:  reports that she has never smoked. She has never used smokeless tobacco. She reports that she does not drink alcohol and does not use drugs. Current Medications:   Current Outpatient Medications   Medication Sig    naproxen (NAPROSYN) 500 mg tablet Take 1 Tablet by mouth two (2) times daily (with meals).  albuterol (PROVENTIL HFA, VENTOLIN HFA, PROAIR HFA) 90 mcg/actuation inhaler Take 2 Puffs by inhalation every four (4) hours as needed for Wheezing.  amoxicillin-clavulanate (Augmentin) 875-125 mg per tablet Take 1 Tablet by mouth two (2) times a day.  (Patient not taking: Reported on 2022)    HYDROcodone-acetaminophen (NORCO) 5-325 mg per tablet TAKE 1 TABLET BY MOUTH EVERY 6 HOURS AS NEEDED (Patient not taking: Reported on 6/17/2022)    ondansetron (ZOFRAN ODT) 4 mg disintegrating tablet Take 1 Tablet by mouth every eight (8) hours as needed for Nausea or Vomiting. (Patient not taking: Reported on 6/17/2022)     Physical Examination: Patient's last menstrual period was 05/27/2022 (exact date). Blood pressure 92/60, pulse 67, temperature 97.3 °F (36.3 °C), temperature source Temporal, height 5' 0.98\" (1.549 m), weight 115 lb (52.2 kg), last menstrual period 05/27/2022, SpO2 99 %. General appearance - well developed, no acute distress. Chest - clear to auscultation. Heart - regular rate and rhythm without murmurs, rubs, or gallops. Abdomen - bowel sounds present x 4, NT, ND  Extremities - no CCE. Left arm proximal to antecubital fossae - tenderness and firmness to the lower biceps muscle. Pain increased with hyperflexion of the arm. An electronic signature was used to authenticate this note.   -- Yousif Hopkins, MATY

## 2022-06-30 ENCOUNTER — OFFICE VISIT (OUTPATIENT)
Dept: FAMILY MEDICINE CLINIC | Age: 22
End: 2022-06-30

## 2022-06-30 DIAGNOSIS — Z71.89 COUNSELING AND COORDINATION OF CARE: Primary | ICD-10-CM

## 2022-06-30 PROCEDURE — 99080 SPECIAL REPORTS OR FORMS: CPT | Performed by: NURSE PRACTITIONER

## 2022-06-30 NOTE — PROGRESS NOTES
AN financial screening is complete. Call date is 7/14/22. Patient has opted out of [facility].

## 2022-08-03 ENCOUNTER — TELEPHONE (OUTPATIENT)
Dept: FAMILY MEDICINE CLINIC | Age: 22
End: 2022-08-03

## 2022-08-03 NOTE — TELEPHONE ENCOUNTER
Patient was inquiring about AN she stated that she has called the number she was given for over a month and has not been able to get in contact with AN.

## 2022-08-13 ENCOUNTER — HOSPITAL ENCOUNTER (OUTPATIENT)
Dept: ULTRASOUND IMAGING | Age: 22
Discharge: HOME | End: 2022-08-13
Attending: NURSE PRACTITIONER

## 2022-08-13 DIAGNOSIS — D18.03 LIVER HEMANGIOMA: ICD-10-CM

## 2022-08-13 PROCEDURE — 76700 US EXAM ABDOM COMPLETE: CPT

## 2022-08-24 DIAGNOSIS — R16.0 LIVER MASS: Primary | ICD-10-CM

## 2022-08-24 NOTE — PROGRESS NOTES
Diagnoses and all orders for this visit:    1.  Liver mass  -     MRI ABD W WO CONT; Future    Chapin Care, NP

## 2022-08-24 NOTE — PROGRESS NOTES
Please call patient to schedule her MRI with and without contrast.  See telephone call and orders.   Thank you, Chelsea Mcdonald

## 2022-08-24 NOTE — TELEPHONE ENCOUNTER
8/24/2022  Telephone call to patient, reached patient. Verified full name and date of birth. Discussed  ultrasound results:   IMPRESSION  2.1 x 1.9 x 1.2 cm isoechoic, vascular left lobe hepatic mass, not  clearly a hemangioma by ultrasound. Recommend MRI with and without contrast for  further characterization. Differential diagnosis includes hepatic adenoma. And recommendation to have an MRI with and without contrast.  Patient stated understanding, knows to await nurse phone call.   Britton Montez NP

## 2022-09-15 ENCOUNTER — HOSPITAL ENCOUNTER (EMERGENCY)
Age: 22
Discharge: HOME | End: 2022-09-15
Attending: STUDENT IN AN ORGANIZED HEALTH CARE EDUCATION/TRAINING PROGRAM

## 2022-09-15 VITALS
HEART RATE: 70 BPM | DIASTOLIC BLOOD PRESSURE: 74 MMHG | BODY MASS INDEX: 22.58 KG/M2 | HEIGHT: 60 IN | SYSTOLIC BLOOD PRESSURE: 111 MMHG | OXYGEN SATURATION: 100 % | RESPIRATION RATE: 15 BRPM | WEIGHT: 115 LBS | TEMPERATURE: 97.8 F

## 2022-09-15 DIAGNOSIS — R51.9 ACUTE NONINTRACTABLE HEADACHE, UNSPECIFIED HEADACHE TYPE: Primary | ICD-10-CM

## 2022-09-15 LAB
ANION GAP SERPL CALC-SCNC: 6 MMOL/L (ref 5–15)
BASOPHILS # BLD: 0.1 K/UL (ref 0–0.1)
BASOPHILS NFR BLD: 1 % (ref 0–1)
BUN SERPL-MCNC: 14 MG/DL (ref 6–20)
BUN/CREAT SERPL: 18 (ref 12–20)
CALCIUM SERPL-MCNC: 9.1 MG/DL (ref 8.5–10.1)
CHLORIDE SERPL-SCNC: 104 MMOL/L (ref 97–108)
CO2 SERPL-SCNC: 30 MMOL/L (ref 21–32)
CREAT SERPL-MCNC: 0.8 MG/DL (ref 0.55–1.02)
DIFFERENTIAL METHOD BLD: NORMAL
EOSINOPHIL # BLD: 0.2 K/UL (ref 0–0.4)
EOSINOPHIL NFR BLD: 3 % (ref 0–7)
ERYTHROCYTE [DISTWIDTH] IN BLOOD BY AUTOMATED COUNT: 12.5 % (ref 11.5–14.5)
GLUCOSE SERPL-MCNC: 78 MG/DL (ref 65–100)
HCT VFR BLD AUTO: 38.1 % (ref 35–47)
HGB BLD-MCNC: 12.7 G/DL (ref 11.5–16)
IMM GRANULOCYTES # BLD AUTO: 0 K/UL (ref 0–0.04)
IMM GRANULOCYTES NFR BLD AUTO: 0 % (ref 0–0.5)
LYMPHOCYTES # BLD: 2.6 K/UL (ref 0.8–3.5)
LYMPHOCYTES NFR BLD: 37 % (ref 12–49)
MCH RBC QN AUTO: 28.7 PG (ref 26–34)
MCHC RBC AUTO-ENTMCNC: 33.3 G/DL (ref 30–36.5)
MCV RBC AUTO: 86.2 FL (ref 80–99)
MONOCYTES # BLD: 0.5 K/UL (ref 0–1)
MONOCYTES NFR BLD: 8 % (ref 5–13)
NEUTS SEG # BLD: 3.6 K/UL (ref 1.8–8)
NEUTS SEG NFR BLD: 51 % (ref 32–75)
NRBC # BLD: 0 K/UL (ref 0–0.01)
NRBC BLD-RTO: 0 PER 100 WBC
PLATELET # BLD AUTO: 349 K/UL (ref 150–400)
PMV BLD AUTO: 9.6 FL (ref 8.9–12.9)
POTASSIUM SERPL-SCNC: 3.5 MMOL/L (ref 3.5–5.1)
RBC # BLD AUTO: 4.42 M/UL (ref 3.8–5.2)
SODIUM SERPL-SCNC: 140 MMOL/L (ref 136–145)
WBC # BLD AUTO: 7 K/UL (ref 3.6–11)

## 2022-09-15 PROCEDURE — 36415 COLL VENOUS BLD VENIPUNCTURE: CPT

## 2022-09-15 PROCEDURE — 96361 HYDRATE IV INFUSION ADD-ON: CPT

## 2022-09-15 PROCEDURE — 99284 EMERGENCY DEPT VISIT MOD MDM: CPT

## 2022-09-15 PROCEDURE — 74011250636 HC RX REV CODE- 250/636: Performed by: STUDENT IN AN ORGANIZED HEALTH CARE EDUCATION/TRAINING PROGRAM

## 2022-09-15 PROCEDURE — 96375 TX/PRO/DX INJ NEW DRUG ADDON: CPT

## 2022-09-15 PROCEDURE — 85025 COMPLETE CBC W/AUTO DIFF WBC: CPT

## 2022-09-15 PROCEDURE — 96374 THER/PROPH/DIAG INJ IV PUSH: CPT

## 2022-09-15 PROCEDURE — 80048 BASIC METABOLIC PNL TOTAL CA: CPT

## 2022-09-15 RX ORDER — KETOROLAC TROMETHAMINE 30 MG/ML
30 INJECTION, SOLUTION INTRAMUSCULAR; INTRAVENOUS
Status: COMPLETED | OUTPATIENT
Start: 2022-09-15 | End: 2022-09-15

## 2022-09-15 RX ORDER — ONDANSETRON 4 MG/1
4 TABLET, ORALLY DISINTEGRATING ORAL
Qty: 12 TABLET | Refills: 0 | Status: SHIPPED | OUTPATIENT
Start: 2022-09-15

## 2022-09-15 RX ORDER — BUTALBITAL, ACETAMINOPHEN AND CAFFEINE 300; 40; 50 MG/1; MG/1; MG/1
1 CAPSULE ORAL
Qty: 12 CAPSULE | Refills: 0 | Status: SHIPPED | OUTPATIENT
Start: 2022-09-15

## 2022-09-15 RX ORDER — DIPHENHYDRAMINE HYDROCHLORIDE 50 MG/ML
25 INJECTION, SOLUTION INTRAMUSCULAR; INTRAVENOUS
Status: COMPLETED | OUTPATIENT
Start: 2022-09-15 | End: 2022-09-15

## 2022-09-15 RX ORDER — PROCHLORPERAZINE EDISYLATE 5 MG/ML
10 INJECTION INTRAMUSCULAR; INTRAVENOUS
Status: COMPLETED | OUTPATIENT
Start: 2022-09-15 | End: 2022-09-15

## 2022-09-15 RX ADMIN — SODIUM CHLORIDE 1000 ML: 9 INJECTION, SOLUTION INTRAVENOUS at 16:49

## 2022-09-15 RX ADMIN — KETOROLAC TROMETHAMINE 30 MG: 30 INJECTION, SOLUTION INTRAMUSCULAR at 16:55

## 2022-09-15 RX ADMIN — PROCHLORPERAZINE EDISYLATE 10 MG: 5 INJECTION INTRAMUSCULAR; INTRAVENOUS at 16:55

## 2022-09-15 RX ADMIN — DIPHENHYDRAMINE HYDROCHLORIDE 25 MG: 50 INJECTION INTRAMUSCULAR; INTRAVENOUS at 16:54

## 2022-09-15 NOTE — DISCHARGE INSTRUCTIONS
- Continue Fioricet as needed for headache, Zofran for nausea  - Drink lots of fluids throughout the day  - Return immediately to the ER for severe or worsening headache, loss of vision, neck pain, vomiting, weakness in an arm or a leg, confusion or decreased responsiveness, dizziness, or difficulty walking.

## 2022-09-15 NOTE — ED PROVIDER NOTES
Chief Complaint   Patient presents with    Headache     This is a 80-year-old female with history of asthma, depression, recent laparoscopic surgery for endometriosis, presenting with headache that started 4 days ago without any precipitant. She says that the pain starts across the top of her head feels warm, radiates to her temples and her ears, she has pain behind her eyes, is nauseous. Also reports pain diffusely across her neck. Endorses photophobia. Denies loss of vision, lateralizing weakness or sensory deficits, vomiting, gait instability. She tried using some Tylenol earlier today without relief. No recent falls or head trauma. Has some residual abdominal soreness from her previous surgery but no drainage from her wounds, no difficulty urinating, and she's been able to eat and drink. No other systemic complaints. Symptoms are moderate in nature without alleviating factors. Review of Systems   Constitutional:  Negative for fever. HENT:  Negative for facial swelling. Eyes:  Negative for visual disturbance. Respiratory:  Negative for cough. Cardiovascular:  Negative for leg swelling. Gastrointestinal:  Positive for abdominal pain and nausea. Genitourinary:  Negative for difficulty urinating. Musculoskeletal:  Positive for neck pain. Negative for gait problem. Skin:  Positive for wound. Neurological:  Positive for headaches. Negative for numbness. Psychiatric/Behavioral:  Negative for confusion. Past Medical History:   Diagnosis Date    Asthma     Depression     Hepatitis B     History of miscarriage 01/28/2018    History of rashes as a child        Past Surgical History:   Procedure Laterality Date    HX CYST REMOVAL Left     Cyst removal of left hand.           Family History:   Problem Relation Age of Onset    Other Mother         ovarian cysts    Thyroid Disease Mother     Kidney Disease Mother     Other Sister         ovarian cysts, on bcp    Heart Disease Father High Cholesterol Father     Arthritis Father        Social History     Socioeconomic History    Marital status:      Spouse name: Not on file    Number of children: Not on file    Years of education: Not on file    Highest education level: Not on file   Occupational History    Not on file   Tobacco Use    Smoking status: Never    Smokeless tobacco: Never   Vaping Use    Vaping Use: Never used   Substance and Sexual Activity    Alcohol use: No    Drug use: No    Sexual activity: Yes     Partners: Male     Birth control/protection: None   Other Topics Concern    Not on file   Social History Narrative    Not on file     Social Determinants of Health     Financial Resource Strain: Not on file   Food Insecurity: Not on file   Transportation Needs: Not on file   Physical Activity: Not on file   Stress: Not on file   Social Connections: Not on file   Intimate Partner Violence: Not on file   Housing Stability: Not on file         ALLERGIES: Patient has no known allergies.       Vitals:    09/15/22 1555   BP: 118/76   Pulse: 85   Resp: 16   Temp: 98.2 °F (36.8 °C)   SpO2: 100%   Weight: 52.2 kg (115 lb)   Height: 5' (1.524 m)       Physical exam  General:  Awake and alert, NAD  HEENT:  NC/AT, equal pupils 3 mm and reactive, moist mucous membranes, right cerumen impaction, left TM clear  Neck:   Normal inspection, full range of motion  Cardiac:  RRR, no murmurs  Respiratory:  Clear bilaterally, no wheezes, rales, rhonchi  Abdomen:  Soft and nondistended, mild diffuse tenderness without guarding, laparoscopic wounds clean/dry/intact without erythema or drainage  Extremities: Warm and well perfused, no peripheral edema  Neuro:  CN grossly intact, moving all extremities symmetrically without gross motor deficit, no sensory deficits appreciated, normal gait  Skin:   No rashes, ecchymoses, or pallor    Recent Results (from the past 12 hour(s))   CBC WITH AUTOMATED DIFF    Collection Time: 09/15/22  4:50 PM   Result Value Ref Range    WBC 7.0 3.6 - 11.0 K/uL    RBC 4.42 3.80 - 5.20 M/uL    HGB 12.7 11.5 - 16.0 g/dL    HCT 38.1 35.0 - 47.0 %    MCV 86.2 80.0 - 99.0 FL    MCH 28.7 26.0 - 34.0 PG    MCHC 33.3 30.0 - 36.5 g/dL    RDW 12.5 11.5 - 14.5 %    PLATELET 758 843 - 950 K/uL    MPV 9.6 8.9 - 12.9 FL    NRBC 0.0 0  WBC    ABSOLUTE NRBC 0.00 0.00 - 0.01 K/uL    NEUTROPHILS 51 32 - 75 %    LYMPHOCYTES 37 12 - 49 %    MONOCYTES 8 5 - 13 %    EOSINOPHILS 3 0 - 7 %    BASOPHILS 1 0 - 1 %    IMMATURE GRANULOCYTES 0 0.0 - 0.5 %    ABS. NEUTROPHILS 3.6 1.8 - 8.0 K/UL    ABS. LYMPHOCYTES 2.6 0.8 - 3.5 K/UL    ABS. MONOCYTES 0.5 0.0 - 1.0 K/UL    ABS. EOSINOPHILS 0.2 0.0 - 0.4 K/UL    ABS. BASOPHILS 0.1 0.0 - 0.1 K/UL    ABS. IMM. GRANS. 0.0 0.00 - 0.04 K/UL    DF AUTOMATED     METABOLIC PANEL, BASIC    Collection Time: 09/15/22  4:50 PM   Result Value Ref Range    Sodium 140 136 - 145 mmol/L    Potassium 3.5 3.5 - 5.1 mmol/L    Chloride 104 97 - 108 mmol/L    CO2 30 21 - 32 mmol/L    Anion gap 6 5 - 15 mmol/L    Glucose 78 65 - 100 mg/dL    BUN 14 6 - 20 MG/DL    Creatinine 0.80 0.55 - 1.02 MG/DL    BUN/Creatinine ratio 18 12 - 20      GFR est AA >60 >60 ml/min/1.73m2    GFR est non-AA >60 >60 ml/min/1.73m2    Calcium 9.1 8.5 - 10.1 MG/DL      No results found. Procedures - none unless documented below    ED course: Labs reviewed. After receiving IV fluids, Toradol, compazine/Benadryl, symptoms now improving. Given hx and exam, reassessment, low suspicion at this moment in time for [facility], meningitis/encephalitis, cervical dissection, cerebral venous sinus thrombosis, or other emergent intracranial pathology. Will discharge home and have her follow up with primary care and with outpatient neurology to be reevaluated. Strict return precautions communicated.     The patient has been given verbal and written advice regarding today's presentation including reasons to re-attend, specifically signs and symptoms of concern or worsening condition were discussed and understood by the patient.      Impression: Acute headache  Disposition: Discharge home

## 2022-09-15 NOTE — ED TRIAGE NOTES
Pt arrives ambulatory to ED w/ c/c of HA since Sunday night. Pt reports the top of her head is aching w/ BL ear pain. Pt denies numbness/tingling to extremities. Pt reports photosensitivity. Pt reports she took Tylenol & Flanax yesterday w/out relief.

## 2022-09-19 ENCOUNTER — TELEPHONE (OUTPATIENT)
Dept: FAMILY MEDICINE CLINIC | Age: 22
End: 2022-09-19

## 2022-09-19 NOTE — TELEPHONE ENCOUNTER
9/19/2022  Noted. MATY Parish sent to Sarah Castillo NP; Kristi Castro w/ this patient. She is feeling better and would not like a f/up appt at this time. Her headache is down from a 10/10 to a 5/10. She will call at the end of the week if her symptoms persist. I outlined concerning symptoms that would warrant a return to the ER. Side note: She did tell me this headache started 2 days after laparoscopy for endometriosis. I will f/up as needed. Previous Messages     ----- Message -----   From: Chiquis Davis RN   Sent: 9/19/2022   1:55 PM EDT   To: Serenity Grant   Subject: RE: ED discharge-Kaitlin                               ----- Message -----   From: Crow Dixon RN   Sent: 9/16/2022   8:32 AM EDT   To: Adam Valente RN   Subject: ED discharge-Kaitlin Nickerson,     Patient was seen in the [facility] ED on 09-15-22. Follow-up with PCP and Neurology recommended. Patient is currently enrolled in the Access Now program, but has not been referred to them for Neurology. Her ED AVS provided information for [facility].      The patient is not enrolled in the PAP/TPC program.       Thanks,     -Akiko

## 2022-09-22 NOTE — PROGRESS NOTES
Closing this encounter. The pt is scheduled for an MRI on 10/09/22 at [facility].  Yudi Thompson RN

## 2022-09-26 ENCOUNTER — TELEPHONE (OUTPATIENT)
Dept: FAMILY MEDICINE CLINIC | Age: 22
End: 2022-09-26

## 2022-09-26 NOTE — TELEPHONE ENCOUNTER
RN f/up from last week. Pt states that her headache is now intermittent and she is not experiencing new sypmtoms. Pt confirmed she does not want an appt at this time but does want to try the medication prescribed by the ER. RN went over tips and side-effects for Fioricet with patient and texted a goodrx coupon. RN explained that with continuing headache, a f/up is advised especially since the ER recommended a neuro consult. RN offered a VV for ED f/up, but patient declined. Per pt request, RN provided the morning line number in case she changes her mind. Time for questions provided, patient verbalized understanding.

## 2022-11-29 ENCOUNTER — TELEPHONE (OUTPATIENT)
Dept: FAMILY MEDICINE CLINIC | Age: 22
End: 2022-11-29

## 2022-11-29 NOTE — TELEPHONE ENCOUNTER
Patient sent in an appointment request on 11/28/2022 at 8:11PM. Reached out to the patient on 11/29/2022 at 10:00AM to explain the appointment making process. Notified that patient that she could call Monday- Friday from 7:00AM-8:30AM to schedule a same day appointment.  Or that she could call whenever she was available to come into the clinic from 7:00AM-8:30AM.   This is in regards to a problem appointment   \"Appointment Request From: Ciera Brink    [facility]    Preferred Date Range: 11/29/2022 - 12/2/2022    Preferred Times: Any Time    Reason for visit: Request an Appointment    Comments:  My shins hurt so bad when I run, feels like my bones will break while I run\"

## 2023-01-05 ENCOUNTER — OFFICE VISIT (OUTPATIENT)
Dept: FAMILY MEDICINE CLINIC | Age: 23
End: 2023-01-05

## 2023-01-05 ENCOUNTER — HOSPITAL ENCOUNTER (OUTPATIENT)
Dept: LAB | Age: 23
Discharge: HOME | End: 2023-01-05

## 2023-01-05 VITALS
TEMPERATURE: 97.9 F | SYSTOLIC BLOOD PRESSURE: 107 MMHG | OXYGEN SATURATION: 99 % | DIASTOLIC BLOOD PRESSURE: 74 MMHG | HEART RATE: 99 BPM | BODY MASS INDEX: 21.68 KG/M2 | WEIGHT: 111 LBS

## 2023-01-05 DIAGNOSIS — G89.29 CHRONIC LEFT-SIDED LOW BACK PAIN WITH LEFT-SIDED SCIATICA: Primary | ICD-10-CM

## 2023-01-05 DIAGNOSIS — R53.83 FATIGUE, UNSPECIFIED TYPE: ICD-10-CM

## 2023-01-05 DIAGNOSIS — M79.669 PAIN IN SHIN, UNSPECIFIED LATERALITY: ICD-10-CM

## 2023-01-05 DIAGNOSIS — M54.42 CHRONIC LEFT-SIDED LOW BACK PAIN WITH LEFT-SIDED SCIATICA: Primary | ICD-10-CM

## 2023-01-05 PROCEDURE — 85027 COMPLETE CBC AUTOMATED: CPT

## 2023-01-05 PROCEDURE — 99214 OFFICE O/P EST MOD 30 MIN: CPT | Performed by: FAMILY MEDICINE

## 2023-01-05 PROCEDURE — 82306 VITAMIN D 25 HYDROXY: CPT

## 2023-01-05 PROCEDURE — 36415 COLL VENOUS BLD VENIPUNCTURE: CPT

## 2023-01-05 PROCEDURE — 84443 ASSAY THYROID STIM HORMONE: CPT

## 2023-01-05 NOTE — PROGRESS NOTES
Verified name and . An AVS was printed and given to the patient. Reviewed all discharge instructions, including: A/N referral instructions, examples of provider-recommended supplement(s), and f/up appt. Allowed time for questions and patient verbalized understanding to all given instructions. Patient discharged from clinic in stable condition.

## 2023-01-05 NOTE — PROGRESS NOTES
Unable to print AVS due to technical difficulties, nurse went over prescription details and instructions for medication sent to the pharmacy. Pharmacy location reviewed. Nurse provided instructions on how to set up her ultrasounds and financial assistance process and application given. Access now program explained (for cardiologist)This has been fully explained to the patient, who indicates understanding and agrees with plan. No further questions at this time. Geri Cortes RN You can continue to take pain medications as prescribed, notify doctor if pain is out of proportion or you experience any of the following: Fever, chills, HA, n/v, wound drainage, sudden weakness, numbness or any other concerns.   keep dressing  it clean and dry. follow up for a wound check and a follow up appointment, call office after dishcarge from hospital to make an appointment.   take barctrim DS twice a day fo 2 days  take flexeril as needed for muscle spasm every 8 hours   Walk out of bed around the house and outdoors, do not exert or engage in heavy lifting or strenuous physical activity. Take stool softeners for constipation. `

## 2023-01-05 NOTE — PROGRESS NOTES
Coordination of Care  1. Have you been to the ER, urgent care clinic since your last visit? Hospitalized since your last visit? No    2. Have you seen or consulted any other health care providers outside of the [address] [place] [address] since your last visit? Include any pap smears or colon screening. No    Does the patient need refills? NO    Learning Assessment Complete?  yes  Depression Screening complete in the past 12 months? yes

## 2023-01-05 NOTE — PROGRESS NOTES
[id number]. [facility]. (: 2000) is a 25 y.o. female, established patient, here for evaluation of the following chief complaint(s):  Leg Pain (Patient c/o bilateral shin pains for 1 year. Hurts when she runs. Feels \"like my shins are breaking\") and Dizziness (Patient c/o increased fatigue, sleeps more, reports dizziness in the mornings. )       ASSESSMENT/PLAN:  1. Chronic left-sided low back pain with left-sided sciatica  Recurrent Sacroiliitis, will start PT  -     REFERRAL TO PHYSICAL THERAPY  2. Pain in shin, unspecified laterality  Rest and avoid activities that aggravate pain. Start Vitamin D supplements. -     VITAMIN D, 25 HYDROXY; Future  3. Fatigue, unspecified type  -     CBC W/O DIFF; Future  -     TSH 3RD GENERATION; Future      SUBJECTIVE:  Leg pain:  B shin pain x 1 year. Occurs only with jump rope and treadmill. Has prior hx of shin splints. Plays soccer without any pain. Denies prior injury. Lt posterior thigh pain:  x 1 year off/on. Sharp pain starting in Lt lower back and radiates to Lt posterior thigh. Can last 2 weeks but improves with rest and stretches. Ibuprofen gives limited relief. Dizziness:  fatigue, dizziness. Review of Systems  NL Menses. OBJECTIVE:  Blood pressure 107/74, pulse 99, temperature 97.9 °F (36.6 °C), temperature source Temporal, weight 111 lb (50.3 kg), last menstrual period 2022, SpO2 99 %. Physical Exam  MUSCULOSKELETAL: Lt painful SI.  PEDRO+ for Lt SI pain. Neg SLR on Lt. Shins without any tenderness or abnormalities today. EXTREMITIES:  no edema, cords, varicose veins. No results found for this visit on 23. An electronic signature was used to authenticate this note.   -- Cristina Laboy MD

## 2023-01-06 LAB
25(OH)D3 SERPL-MCNC: 18.9 NG/ML (ref 30–100)
ERYTHROCYTE [DISTWIDTH] IN BLOOD BY AUTOMATED COUNT: 12.6 % (ref 11.5–14.5)
HCT VFR BLD AUTO: 40.2 % (ref 35–47)
HGB BLD-MCNC: 12.9 G/DL (ref 11.5–16)
MCH RBC QN AUTO: 28.9 PG (ref 26–34)
MCHC RBC AUTO-ENTMCNC: 32.1 G/DL (ref 30–36.5)
MCV RBC AUTO: 89.9 FL (ref 80–99)
NRBC # BLD: 0 K/UL (ref 0–0.01)
NRBC BLD-RTO: 0 PER 100 WBC
PLATELET # BLD AUTO: 337 K/UL (ref 150–400)
PMV BLD AUTO: 10.8 FL (ref 8.9–12.9)
RBC # BLD AUTO: 4.47 M/UL (ref 3.8–5.2)
TSH SERPL DL<=0.05 MIU/L-ACNC: 5.52 UIU/ML (ref 0.36–3.74)
WBC # BLD AUTO: 9 K/UL (ref 3.6–11)

## 2023-01-09 NOTE — PROGRESS NOTES
Spoke with patient and reviewed results. Vitamin D is low, will start Rx: 50,000 Unit weekly eRxd to pharmacy. TSH is high suggesting low thyroid. Since this test can be mildly elevated due to stress would replace Vitamin D and recheck TSH and Free T4 in 6 weeks. Patient understood and agrees with plan. Please schedule pt for lab visit in 6 weeks. Labs orders entered today.

## 2023-02-24 ENCOUNTER — TELEPHONE (OUTPATIENT)
Dept: FAMILY MEDICINE CLINIC | Age: 23
End: 2023-02-24

## 2023-02-24 ENCOUNTER — HOSPITAL ENCOUNTER (OUTPATIENT)
Dept: LAB | Age: 23
Discharge: HOME | End: 2023-02-24

## 2023-02-24 ENCOUNTER — OFFICE VISIT (OUTPATIENT)
Dept: FAMILY MEDICINE CLINIC | Age: 23
End: 2023-02-24

## 2023-02-24 VITALS
DIASTOLIC BLOOD PRESSURE: 63 MMHG | SYSTOLIC BLOOD PRESSURE: 106 MMHG | TEMPERATURE: 98.3 F | OXYGEN SATURATION: 100 % | HEART RATE: 63 BPM | WEIGHT: 110 LBS | BODY MASS INDEX: 21.48 KG/M2

## 2023-02-24 DIAGNOSIS — N94.10 DYSPAREUNIA, FEMALE: ICD-10-CM

## 2023-02-24 DIAGNOSIS — R10.2 PELVIC PAIN: ICD-10-CM

## 2023-02-24 DIAGNOSIS — N80.9 ENDOMETRIOSIS: ICD-10-CM

## 2023-02-24 DIAGNOSIS — R10.2 PELVIC PAIN: Primary | ICD-10-CM

## 2023-02-24 DIAGNOSIS — K59.00 CONSTIPATION, UNSPECIFIED CONSTIPATION TYPE: ICD-10-CM

## 2023-02-24 DIAGNOSIS — R79.89 ELEVATED TSH: ICD-10-CM

## 2023-02-24 PROCEDURE — 36415 COLL VENOUS BLD VENIPUNCTURE: CPT

## 2023-02-24 PROCEDURE — 84443 ASSAY THYROID STIM HORMONE: CPT

## 2023-02-24 PROCEDURE — 84703 CHORIONIC GONADOTROPIN ASSAY: CPT

## 2023-02-24 PROCEDURE — 84439 ASSAY OF FREE THYROXINE: CPT

## 2023-02-24 RX ORDER — POLYETHYLENE GLYCOL 3350 17 G/17G
17 POWDER, FOR SOLUTION ORAL DAILY
Qty: 510 G | Refills: 0 | Status: SHIPPED | OUTPATIENT
Start: 2023-02-24

## 2023-02-24 NOTE — PROGRESS NOTES
Lorrie Pittman  seen at d/c, full name and  verified. After Visit Summary provided and reviewed along with discharge instructions and when it is recommended to come back. Advised patient to schedule follow up appointment before she leaves today. Paulina Murphy LPN    Medications reviewed with patient, patient given coupon for medication to present to pharmacy. Patient declined assistance with scheduling for ultrasound ( pelvic and transvaginal ), patient states \" I will call myself to schedule. \"  Patient informed that someone will reach out to her to schedule GYN via access now; patient verbalized understanding.  Roxi Carter, MACHON
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
## 2023-02-24 NOTE — LETTER
2/24/2023    Lorrie A [facility]  2000  [address] [place] [address]      Dear Maribeth Alba,    I regret to inform you that the physician(s) or advance practice clinician(s) at [address] [place] [address],[address] will no longer be able to provide your medical care, effective from the date of this letter. I recommend that you immediately find a new provider. We will provide prescriptions for your current medications based on your past refill history to ensure you will have sufficient medications to last 30 days from the date of this letter. To obtain your medical records, please contact our Medical Records department. You may contact your insurance provider, managed care organization (if applicable) or local Medical Society to obtain a current listing of providers available to provide care. I urge you to see a new provider quickly so that there will be no interruption of your care. I wish you the best in your future medical care.           Sincerely,    Lin Perez RN  Supervisor, Clinical Operations

## 2023-02-24 NOTE — PROGRESS NOTES
Coordination of Care  1. Have you been to the ER, urgent care clinic since your last visit? Hospitalized since your last visit? No    2. Have you seen or consulted any other health care providers outside of the [address] [place] [address] since your last visit? Include any pap smears or colon screening. No    Does the patient need refills? N/A    Learning Assessment Complete?  yes
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
## 2023-02-25 LAB
HCG SERPL QL: NEGATIVE
T4 FREE SERPL-MCNC: 1 NG/DL (ref 0.8–1.5)
TSH SERPL DL<=0.05 MIU/L-ACNC: 2.24 UIU/ML (ref 0.36–3.74)

## 2023-02-27 ENCOUNTER — PATIENT MESSAGE (OUTPATIENT)
Dept: FAMILY MEDICINE CLINIC | Age: 23
End: 2023-02-27

## 2023-03-02 ENCOUNTER — DOCUMENTATION ONLY (OUTPATIENT)
Dept: FAMILY MEDICINE CLINIC | Age: 23
End: 2023-03-02

## 2023-04-26 NOTE — MR AVS SNAPSHOT
Visit Information Beatriz Astorga Personal Médico Departamento Teléfono del Dep. Número de visita 9/12/2017  4:05 PM Violet Cook MD [address] [phone] Your Appointments 9/12/2017  4:05 PM  
New Patient with Violet Cook MD  
[address] [place]) Appt Note: newpt/preg test- positive home test  
 [address] [place] [address],[facility] [address] [place] [address]  
[phone]  
  
   
 [address] [place] [address] 3 [facility] [id number] Upcoming Health Maintenance Date Due INFLUENZA AGE 9 TO ADULT 8/1/2017 DTaP/Tdap/Td series (6 - Td) 8/24/2020 Alergias  Review Complete El: 9/12/2017 Por: MIL Gardner partir del:  9/12/2017 No Known Allergies Vacunas actuales [id number] el:  10/28/2014 Clemetine Ours DTaP 2/24/2005, 8/16/2004, 7/14/2004, 5/12/2004 HPV 4/10/2012, 10/18/2011, 8/9/2011 Hep A Vaccine 1/13/2005, 5/12/2004 Hep B Vaccine 1/13/2005, 7/15/2004, 6/15/2004, 5/12/2004, 1/13/2004 Hib 5/12/2004 Influenza Nasal Vaccine 10/28/2014 Influenza Vaccine 4/10/2012, 10/18/2011 Influenza Vaccine PF 2/11/2014 MMR 6/15/2004, 5/12/2004 Meningococcal (MCV4P) Vaccine 7/11/2017 Meningococcal ACWY Vaccine 8/9/2011 Pneumococcal Vaccine (Unspecified Type) 1/13/2005 Poliovirus vaccine 2/24/2005, 8/16/2004, 7/14/2004, 5/12/2004 Tdap 8/24/2010 Varicella Virus Vaccine 8/9/2011, 8/24/2010, 5/12/2004 No revisadas esta visita You Were Diagnosed With   
  
 Madybharath Marks menses    -  Primary ICD-10-CM: N92.6 ICD-9-CM: 626.4 Partes vitales [place] ( percentil de crecimiento) Peso (percentil de crecimiento) BMI [facility]) Estado obstétrico Estatus de tabaquísmo 4' 11\" (1.499 m) (2 %, Z= -2.03)* 105 lb (47.6 kg) (13 %, Z= -1.12)* 21.21 kg/m2 (52 %, Z= 0.04)* Having regular periods Never Smoker *Growth percentiles are based on CDC 2-20 Years data. Historial de signos vitales BMI and BSA Data Body Mass Index Body Surface Area  
 21.21 kg/m 2 1.41 m 2 Preferred Pharmacy Pharmacy Name Phone [facility] [address] [facility] [phone] Noguera lista de medicamentos actualizada Lista actualizada el: 9/12/17  3:45 PM.  Lj Beam use noguera lista de medicamentos más reciente. albuterol 90 mcg/actuation inhaler También conocido galileo:  PROVENTIL HFA, VENTOLIN HFA, PROAIR HFA Take 2 Puffs by inhalation every four (4) hours as needed for Wheezing or Shortness of Breath.  
  
 prenatal vit-iron fumarate-fa 27 mg iron- 0.8 mg Tab tablet Take 1 Tab by mouth daily. Prescriptions Sent to Pharmacy Refills  
 prenatal vit-iron fumarate-fa 27 mg iron- 0.8 mg tab tablet 9 Sig: Take 1 Tab by mouth daily. Class: Normal  
 Pharmacy: [facility] 22, [address] [facility] #: [phone] Route: Oral  
  
Hicimos lo siguiente AMB POC URINE PREGNANCY TEST, VISUAL COLOR COMPARISON [82884 CPT(R)] Instrucciones para el Paciente Please call in 2-3 weeks to make an appointment to be seen in early October for your initial prenatal as well as your dating ultrasound. This needs to be done before you're 12 weeks Weeks 6 to 10 of Your Pregnancy: Care Instructions Your Care Instructions Congratulations on your pregnancy. This is an exciting and important time for you. During the first 6 to 10 weeks of your pregnancy, your body goes through many changes. Your baby grows very fast, even though you cannot feel it yet. You may start to notice that you feel different, both in your body and your emotions. Because each woman's pregnancy is unique, there is no right way to feel.  You may feel the healthiest you have ever been, or you may feel tired or sick to your stomach (\"morning sickness\"). These early weeks are a time to make healthy choices and to eat the best foods for you and your baby. This care sheet will give you some ideas. This is also a good time to think about birth defects testing. These are tests done during pregnancy to look for possible problems with the baby. First trimester tests for birth defects can be done between 8 and 17 weeks of pregnancy, depending on the test. Talk with your doctor about what kinds of tests are available. Follow-up care is a key part of your treatment and safety. Be sure to make and go to all appointments, and call your doctor if you are having problems. It's also a good idea to know your test results and keep a list of the medicines you take. How can you care for yourself at home? Eat well · Eat at least 3 meals and 2 healthy snacks every day. Eat fresh, whole foods, including: ¨ 7 or more servings of bread, tortillas, cereal, rice, pasta, or oatmeal. 
¨ 3 or more servings of vegetables, especially leafy green vegetables. ¨ 2 or more servings of fruits. ¨ 3 or more servings of milk, yogurt, or cheese. ¨ 2 or more servings of meat, turkey, chicken, fish, eggs, or dried beans. · Drink plenty of fluids, especially water. Avoid sodas and other sweetened drinks. · Choose foods that have important vitamins for your baby, such as calcium, iron, and folate. ¨ Dairy products, tofu, canned fish with bones, almonds, broccoli, dark leafy greens, corn tortillas, and fortified orange juice are good sources of calcium. ¨ Beef, poultry, liver, spinach, lentils, dried beans, fortified cereals, and dried fruits are rich in iron. ¨ Dark leafy greens, broccoli, asparagus, liver, fortified cereals, orange juice, peanuts, and almonds are good sources of folate. · Avoid foods that could harm your baby. ¨ Do not eat raw or undercooked meat, chicken, or fish (such as sushi or raw oysters). ¨ Do not eat raw eggs or foods that contain raw eggs, such as Caesar dressing. ¨ Do not eat soft cheeses and unpasteurized dairy foods, such as Brie, feta, or blue cheese. ¨ Do not eat fish that contains a lot of mercury, such as shark, swordfish, tilefish, or clovis mackerel. Do not eat more than 6 ounces of tuna each week. ¨ Do not eat raw sprouts, especially alfalfa sprouts. ¨ Cut down on caffeine, such as coffee, tea, and cola. Protect yourself and your baby · Do not touch jeff litter or cat feces. They can cause an infection that could harm your baby. · High body temperature can be harmful to your baby. So if you want to use a sauna or hot tub, be sure to talk to your doctor about how to use it safely. [place] with morning sickness · Sip small amounts of water, juices, or shakes. Try drinking between meals, not with meals. · Eat 5 or 6 small meals a day. Try dry toast or crackers when you first get up, and eat breakfast a little later. · Avoid spicy, greasy, and fatty foods. · When you feel sick, open your windows or go for a short walk to get fresh air. · Try nausea wristbands. These help some women. · Tell your doctor if you think your prenatal vitamins make you sick. Where can you learn more? Go to http://ann-edwardo.info/. Enter G112 in the search box to learn more about \"Weeks 6 to 10 of Your Pregnancy: Care Instructions. \" Current as of: March 16, 2017 Content Version: 11.3 © 6182-2838 "[employer]. Care instructions adapted under license by [employer] (which disclaims liability or warranty for this information). If you have questions about a medical condition or this instruction, always ask your healthcare professional. [address] any warranty or liability for your use of this information. Introducing [facility] & HEALTH SERVICES! Dear Parent or Guardian, Thank you for requesting a CAPPTURE account for your child.   With CAPPTURE, you can view your childs hospital or ER discharge instructions, current allergies, immunizations and much more. In order to access your childs information, we require a signed consent on file. Please see the [facility] department or call [phone] for instructions on completing a [employer] Proxy request.   
Additional Information If you have questions, please visit the Frequently Asked Questions section of the [employer] website at [email or web address] Remember, [employer] is NOT to be used for urgent needs. For medical emergencies, dial 911. Now available from your iPhone and Android! Please provide this summary of care documentation to your next provider. Your primary care clinician is listed as NONE. If you have any questions after today's visit, please call [phone]. Propranolol Counseling:  I discussed with the patient the risks of propranolol including but not limited to low heart rate, low blood pressure, low blood sugar, restlessness and increased cold sensitivity. They should call the office if they experience any of these side effects.

## 2023-05-22 RX ORDER — ERGOCALCIFEROL 1.25 MG/1
50000 CAPSULE ORAL
COMMUNITY
Start: 2023-01-09

## 2023-05-22 RX ORDER — POLYETHYLENE GLYCOL 3350 17 G/17G
17 POWDER, FOR SOLUTION ORAL DAILY
COMMUNITY
Start: 2023-02-24

## 2023-05-22 RX ORDER — ALBUTEROL SULFATE 90 UG/1
2 AEROSOL, METERED RESPIRATORY (INHALATION) EVERY 4 HOURS PRN
COMMUNITY
Start: 2022-02-03

## 2024-08-02 ENCOUNTER — HOSPITAL ENCOUNTER (OUTPATIENT)
Facility: HOSPITAL | Age: 24
Setting detail: SPECIMEN
Discharge: HOME | End: 2024-08-05

## 2024-08-02 PROCEDURE — 88175 CYTOPATH C/V AUTO FLUID REDO: CPT

## 2024-08-02 PROCEDURE — 87491 CHLMYD TRACH DNA AMP PROBE: CPT

## 2024-08-02 PROCEDURE — 87591 N.GONORRHOEAE DNA AMP PROB: CPT

## 2024-08-02 PROCEDURE — 87661 TRICHOMONAS VAGINALIS AMPLIF: CPT

## 2024-08-06 LAB
C TRACH RRNA SPEC QL NAA+PROBE: NEGATIVE
N GONORRHOEA RRNA SPEC QL NAA+PROBE: NEGATIVE
SPECIMEN SOURCE: NORMAL
T VAGINALIS RRNA SPEC QL NAA+PROBE: NEGATIVE

## 2024-12-16 ENCOUNTER — TELEPHONE (OUTPATIENT)
Age: 24
End: 2024-12-16

## 2024-12-16 NOTE — TELEPHONE ENCOUNTER
Chief Complaint   Patient presents with    Cancelled Appointment     Patient has been dismissed from our practice and appointment to be cancelled.      Patient was called and informed that the nurse was following up to share why her appointment is being cancelled.  Patient made aware of dismissal from practice.  Patient stated \" Yes, I just spoke with someone about my appointment being cancelled.  I would like to speak to someone about this matter because \" It is unfair to me to not to be able to be seen at the clinic.  I would like to discuss this issue.\"  Patient was informed that my supervisor is not available currently but will be made aware of her inquiry and will get a call later today if not tomorrow.  Patient verbalized understanding and call was ended.  Gina Rowell LPN

## 2024-12-16 NOTE — TELEPHONE ENCOUNTER
Patient was given a call to notify that we are cancelling her appointment scheduled for today 12.16.24 at 2:40 PM due to issues with her on the last visit. Please see note from 02.23.23. We are unable to see patient. Will notify supervisor as she is requesting to speak to someone.    Thanks,  Harper

## 2024-12-17 ENCOUNTER — TELEPHONE (OUTPATIENT)
Age: 24
End: 2024-12-17

## 2024-12-17 NOTE — TELEPHONE ENCOUNTER
Nursing Supervisor informed that patient would like to speak with this RN. Telephone call made to patient. Patient did not answer, unable to leave voicemail due to it not being set up. Supervisor to follow up tomorrow.

## 2025-02-25 ENCOUNTER — TRANSCRIBE ORDERS (OUTPATIENT)
Facility: HOSPITAL | Age: 25
End: 2025-02-25

## 2025-02-25 DIAGNOSIS — R42 DIZZINESS: Primary | ICD-10-CM
